# Patient Record
Sex: FEMALE | Race: WHITE | Employment: FULL TIME | URBAN - METROPOLITAN AREA
[De-identification: names, ages, dates, MRNs, and addresses within clinical notes are randomized per-mention and may not be internally consistent; named-entity substitution may affect disease eponyms.]

---

## 2017-04-04 ENCOUNTER — HOSPITAL ENCOUNTER (OUTPATIENT)
Age: 27
Discharge: HOME | End: 2017-04-04
Payer: MEDICAID

## 2017-04-04 VITALS
BODY MASS INDEX: 24.8 KG/M2 | HEIGHT: 63 IN | SYSTOLIC BLOOD PRESSURE: 134 MMHG | DIASTOLIC BLOOD PRESSURE: 83 MMHG | TEMPERATURE: 98 F | OXYGEN SATURATION: 100 % | RESPIRATION RATE: 16 BRPM | HEART RATE: 94 BPM | WEIGHT: 140 LBS

## 2017-04-04 DIAGNOSIS — J02.0 STREPTOCOCCAL SORE THROAT: Primary | ICD-10-CM

## 2017-04-04 PROCEDURE — 87430 STREP A AG IA: CPT

## 2017-04-04 PROCEDURE — 99204 OFFICE O/P NEW MOD 45 MIN: CPT

## 2017-04-04 PROCEDURE — 99213 OFFICE O/P EST LOW 20 MIN: CPT

## 2017-04-04 RX ORDER — AMOXICILLIN 875 MG/1
875 TABLET, COATED ORAL 2 TIMES DAILY
Qty: 20 TABLET | Refills: 0 | Status: SHIPPED | OUTPATIENT
Start: 2017-04-04 | End: 2017-04-14

## 2017-04-04 NOTE — ED INITIAL ASSESSMENT (HPI)
Sick since last night with sore throat, worse today. No congestion/cough. Did not take temp but c/o chills. No N/V/D. Returned last night via airplane from [facility].

## 2017-04-05 NOTE — ED PROVIDER NOTES
Patient presents with:  Sore Throat      HPI:     Nathalia Raines is a 32year old female who presents for evaluation of a chief complaint of a sore throat, body aches, and chills that started today. She recently was on an airplane.   She denies any URI symptom wheezes    MDM/Assessment/Plan:   Orders for this encounter:      Orders Placed This Encounter  POCT Rapid Strep Once  amoxicillin 875 MG Oral Tab   Sig: Take 1 tablet (875 mg total) by mouth 2 (two) times daily.    Dispense:  20 tablet   Refill:  0    Labs

## 2017-05-27 ENCOUNTER — HOSPITAL ENCOUNTER (EMERGENCY)
Facility: HOSPITAL | Age: 27
Discharge: HOME | End: 2017-05-27
Payer: MEDICAID

## 2017-05-27 ENCOUNTER — HOSPITAL (OUTPATIENT)
Dept: OTHER | Age: 27
End: 2017-05-27
Attending: EMERGENCY MEDICINE

## 2017-05-27 VITALS
SYSTOLIC BLOOD PRESSURE: 128 MMHG | TEMPERATURE: 98 F | WEIGHT: 140 LBS | BODY MASS INDEX: 24.8 KG/M2 | DIASTOLIC BLOOD PRESSURE: 77 MMHG | RESPIRATION RATE: 18 BRPM | HEART RATE: 84 BPM | HEIGHT: 63 IN | OXYGEN SATURATION: 100 %

## 2017-05-27 LAB
ANALYZER ANC (IANC): ABNORMAL
ANION GAP SERPL CALC-SCNC: 12 MMOL/L (ref 10–20)
BASOPHILS # BLD: 0 THOUSAND/MCL (ref 0–0.3)
BASOPHILS NFR BLD: 0 %
BUN SERPL-MCNC: 15 MG/DL (ref 6–20)
BUN/CREAT SERPL: 25 (ref 7–25)
CALCIUM SERPL-MCNC: 9.2 MG/DL (ref 8.4–10.2)
CHLORIDE: 103 MMOL/L (ref 98–107)
CO2 SERPL-SCNC: 29 MMOL/L (ref 21–32)
CREAT SERPL-MCNC: 0.6 MG/DL (ref 0.51–0.95)
DIFFERENTIAL METHOD BLD: ABNORMAL
EOSINOPHIL # BLD: 0 THOUSAND/MCL (ref 0.1–0.5)
EOSINOPHIL NFR BLD: 0 %
ERYTHROCYTE [DISTWIDTH] IN BLOOD: 13.6 % (ref 11–15)
GLUCOSE SERPL-MCNC: 100 MG/DL (ref 65–99)
HCG SERPL QL: NEGATIVE
HEMATOCRIT: 43.5 % (ref 36–46.5)
HGB BLD-MCNC: 15.1 GM/DL (ref 12–15.5)
LYMPHOCYTES # BLD: 1.3 THOUSAND/MCL (ref 1–4.8)
LYMPHOCYTES NFR BLD: 11 %
MCH RBC QN AUTO: 29.2 PG (ref 26–34)
MCHC RBC AUTO-ENTMCNC: 34.7 GM/DL (ref 32–36.5)
MCV RBC AUTO: 84.1 FL (ref 78–100)
MONOCYTES # BLD: 0.7 THOUSAND/MCL (ref 0.3–0.9)
MONOCYTES NFR BLD: 6 %
NEUTROPHILS # BLD: 9.6 THOUSAND/MCL (ref 1.8–7.7)
NEUTROPHILS NFR BLD: 83 %
NEUTS SEG NFR BLD: ABNORMAL %
PERCENT NRBC: ABNORMAL
PLATELET # BLD: 335 THOUSAND/MCL (ref 140–450)
POTASSIUM SERPL-SCNC: 3.8 MMOL/L (ref 3.4–5.1)
RBC # BLD: 5.17 MILLION/MCL (ref 4–5.2)
SODIUM SERPL-SCNC: 140 MMOL/L (ref 135–145)
WBC # BLD: 11.7 THOUSAND/MCL (ref 4.2–11)

## 2017-05-28 NOTE — ED INITIAL ASSESSMENT (HPI)
Patient's first day of fasting and patient started having a headache around 2pm. Vomiting started around 2020 after drinking and eating for first time today.

## 2017-11-08 ENCOUNTER — APPOINTMENT (OUTPATIENT)
Dept: GENERAL RADIOLOGY | Age: 27
End: 2017-11-08
Attending: PHYSICIAN ASSISTANT
Payer: COMMERCIAL

## 2017-11-08 ENCOUNTER — HOSPITAL ENCOUNTER (OUTPATIENT)
Age: 27
Discharge: HOME | End: 2017-11-08
Payer: COMMERCIAL

## 2017-11-08 VITALS
HEIGHT: 62 IN | DIASTOLIC BLOOD PRESSURE: 87 MMHG | WEIGHT: 156 LBS | OXYGEN SATURATION: 100 % | BODY MASS INDEX: 28.71 KG/M2 | RESPIRATION RATE: 18 BRPM | TEMPERATURE: 98 F | SYSTOLIC BLOOD PRESSURE: 139 MMHG | HEART RATE: 86 BPM

## 2017-11-08 DIAGNOSIS — S46.912A SHOULDER STRAIN, LEFT, INITIAL ENCOUNTER: Primary | ICD-10-CM

## 2017-11-08 PROCEDURE — 99213 OFFICE O/P EST LOW 20 MIN: CPT

## 2017-11-08 PROCEDURE — 73030 X-RAY EXAM OF SHOULDER: CPT | Performed by: PHYSICIAN ASSISTANT

## 2017-11-08 NOTE — ED INITIAL ASSESSMENT (HPI)
PATIENT ARRIVED AMBULATORY TO ROOM C/O LEFT SHOULDER PAIN. PT STS \"I WAS BOXING 2 DAYS AGO AND I THINK I STRAINED MY SHOULDER\" PT DENIES NUMBNESS/TINGLING TO HAND/ARM. PAIN WORSENS WITH MOVEMENT. PT ALERT AND ORIENTED X3. EASY NON LABORED RESPIRATIONS.  Ashley Martinez

## 2017-11-08 NOTE — ED PROVIDER NOTES
Patient Seen in: [facility]    History   Patient presents with:  Shoulder Pain    Stated Complaint: lt. shoulder pain    HPI    Patient is a 35-year-old female who presents for evaluation of left shoulder pain ×2 days.   P Pulmonary/Chest: Effort normal and breath sounds normal.   Musculoskeletal: She exhibits tenderness. She exhibits no edema or deformity. Left shoulder: She exhibits tenderness and pain.  She exhibits normal range of motion, no bony tenderness and no

## 2018-01-29 ENCOUNTER — HOSPITAL ENCOUNTER (OUTPATIENT)
Age: 28
Discharge: HOME | End: 2018-01-29
Attending: EMERGENCY MEDICINE
Payer: COMMERCIAL

## 2018-01-29 VITALS
OXYGEN SATURATION: 98 % | WEIGHT: 144 LBS | SYSTOLIC BLOOD PRESSURE: 133 MMHG | BODY MASS INDEX: 25.52 KG/M2 | TEMPERATURE: 98 F | RESPIRATION RATE: 20 BRPM | HEART RATE: 100 BPM | HEIGHT: 63 IN | DIASTOLIC BLOOD PRESSURE: 63 MMHG

## 2018-01-29 DIAGNOSIS — J03.90 ACUTE TONSILLITIS, UNSPECIFIED ETIOLOGY: Primary | ICD-10-CM

## 2018-01-29 LAB — S PYO AG THROAT QL: NEGATIVE

## 2018-01-29 PROCEDURE — 87430 STREP A AG IA: CPT

## 2018-01-29 PROCEDURE — 99214 OFFICE O/P EST MOD 30 MIN: CPT

## 2018-01-29 PROCEDURE — 99213 OFFICE O/P EST LOW 20 MIN: CPT

## 2018-01-29 RX ORDER — AMOXICILLIN 875 MG/1
875 TABLET, COATED ORAL 2 TIMES DAILY
Qty: 20 TABLET | Refills: 0 | Status: SHIPPED | OUTPATIENT
Start: 2018-01-29 | End: 2018-02-08

## 2018-01-29 NOTE — ED PROVIDER NOTES
Patient Seen in: [facility]    History   Patient presents with:  Sore Throat    Stated Complaint: sore throat/cold    HPI    Patient complains of sore throat which he has had for the last several days.   The patient states deviated  Neck there is positive bilateral submandibular adenopathy palpable.   Lungs are clear to auscultation  Cardiac there are normal first and second heart sounds  Abdomen is soft and nondistended  Back no CVA or tenderness  Extremities no edema  Neuro

## 2018-01-29 NOTE — ED INITIAL ASSESSMENT (HPI)
PATIENT ARRIVED AMBULATORY TO ROOM WITH MULTIPLE COMPLAINTS. SYMPTOMS STARTED SEVERAL WEEKS AGO.  PATIENT STATES \"I WAS HAVING A LOT OF ABDOMINAL DISCOMFORT THAT KIND OF ENDED AND THE LAST WEEK SINDY HAD A SORE THROAT AND NASAL CONGESTION. +SINUS PRESSURE\"

## 2018-10-25 ENCOUNTER — OFFICE VISIT (OUTPATIENT)
Dept: FAMILY MEDICINE CLINIC | Facility: CLINIC | Age: 28
End: 2018-10-25

## 2018-10-25 VITALS — TEMPERATURE: 99 F | RESPIRATION RATE: 14 BRPM

## 2018-10-25 DIAGNOSIS — J02.9 ACUTE PHARYNGITIS, UNSPECIFIED ETIOLOGY: ICD-10-CM

## 2018-10-25 DIAGNOSIS — J03.90 EXUDATIVE TONSILLITIS: Primary | ICD-10-CM

## 2018-10-25 PROCEDURE — 87880 STREP A ASSAY W/OPTIC: CPT | Performed by: NURSE PRACTITIONER

## 2018-10-25 PROCEDURE — 87081 CULTURE SCREEN ONLY: CPT | Performed by: NURSE PRACTITIONER

## 2018-10-25 PROCEDURE — 99202 OFFICE O/P NEW SF 15 MIN: CPT | Performed by: NURSE PRACTITIONER

## 2018-10-25 RX ORDER — AMOXICILLIN 875 MG/1
875 TABLET, COATED ORAL 2 TIMES DAILY
Qty: 20 TABLET | Refills: 0 | Status: SHIPPED | OUTPATIENT
Start: 2018-10-25 | End: 2018-11-04

## 2018-10-25 NOTE — PROGRESS NOTES
CHIEF COMPLAINT:   Patient presents with:  Sore Throat      HPI:   Karl Cheng is a 29year old female presents to clinic with symptoms of sore throat. Yesterday left tonsil swollen with white spot, fatigued, painful to swallow with radiation to ear.  Some Results (from the past 24 hour(s))   STREP A ASSAY W/OPTIC    Collection Time: 10/25/18  3:12 PM   Result Value Ref Range    STREP GRP A CUL-SCR negative Negative    Control Line Present with a clear background (yes/no) yes Yes/No    Kit Lot # [id number]

## 2019-02-07 ENCOUNTER — OFFICE VISIT (OUTPATIENT)
Dept: FAMILY MEDICINE CLINIC | Facility: CLINIC | Age: 29
End: 2019-02-07

## 2019-02-07 VITALS
SYSTOLIC BLOOD PRESSURE: 124 MMHG | DIASTOLIC BLOOD PRESSURE: 82 MMHG | TEMPERATURE: 99 F | WEIGHT: 152 LBS | HEART RATE: 74 BPM | BODY MASS INDEX: 26.93 KG/M2 | HEIGHT: 63 IN | OXYGEN SATURATION: 100 %

## 2019-02-07 DIAGNOSIS — G44.84 EXERTIONAL HEADACHE: Primary | ICD-10-CM

## 2019-02-07 DIAGNOSIS — R06.02 EXERCISE-INDUCED SHORTNESS OF BREATH: ICD-10-CM

## 2019-02-07 PROCEDURE — 99213 OFFICE O/P EST LOW 20 MIN: CPT | Performed by: PHYSICIAN ASSISTANT

## 2019-02-08 NOTE — PATIENT INSTRUCTIONS
Shortness of Breath (Dyspnea)  Shortness of breath is the feeling that you can't catch your breath or get enough air. It is also known as dyspnea. Dyspnea can be caused by many different conditions.  They include:  · Acute asthma attack  · Worsening of c If an X-ray was taken, a specialist will review it. You will be notified of any new findings that may affect your care. Call 911  Shortness of breath may be a sign of a serious medical problem. For example, it may be a problem with your heart or lungs.  Ca The key to controlling panic is to break the cycle before it starts. When you are becoming short of breath do the following:  · Sit, relax your arms and shoulders. · Lean forward, resting your upper body on your forearms.   · Breathe in slowly through your

## 2019-02-08 NOTE — PROGRESS NOTES
CHIEF COMPLAINT:     Patient presents with:  Shortness Of Breath: 2 days, labored breathing, \"cant catch breath\"  working on conditioning/training.  feels SOB after 4 mins, + headache after running      HPI:   Galo Mas is a 29year old female who pres Tobacco comment: hookah    Alcohol use: Not on file    Drug use: No       REVIEW OF SYSTEMS:   GENERAL: Denies fever, chills,weight change, decreased appetite  EYES: Denies blurred vision or double vision  HENT: Denies congestion, rhinorrhea, sore thro PLAN:  Lengthy discussion on potential etiology:  Exercise induced bronchospasm vs cardiac abnormality vs anxiety/panic/stress.  Discussed how can be multifactorial.  Advised patient avoid cardiovascular exercise until evaluated by PCP/cardiologist.  Nivia Campos · If you smoke, you should stop. Join a quit-smoking program or ask your healthcare provider for help. · Eat a healthy diet and get plenty of sleep. · Get regular exercise.  Talk with your healthcare provider before starting to exercise, especially if you When you have lung problems, it may be hard for you to breathe. Stress can make it worse. Learn to relax and control stress to prevent shortness of breath and avoid panic.   When anxiety takes over  When you feel short of breath, your neck, shoulder, and ch © 4126-9928 [employer] [address]. [address] [facility], [facility] [place]. All rights reserved. This information is not intended as a substitute for professional medical care. Always follow your healthcare professional's instructions.

## 2019-05-15 ENCOUNTER — HOSPITAL ENCOUNTER (EMERGENCY)
Facility: HOSPITAL | Age: 29
Discharge: HOME | End: 2019-05-16
Attending: EMERGENCY MEDICINE

## 2019-05-15 VITALS
SYSTOLIC BLOOD PRESSURE: 96 MMHG | OXYGEN SATURATION: 97 % | RESPIRATION RATE: 15 BRPM | DIASTOLIC BLOOD PRESSURE: 67 MMHG | TEMPERATURE: 98 F | HEART RATE: 64 BPM

## 2019-05-15 DIAGNOSIS — G43.009 MIGRAINE WITHOUT AURA AND WITHOUT STATUS MIGRAINOSUS, NOT INTRACTABLE: Primary | ICD-10-CM

## 2019-05-15 PROCEDURE — 80048 BASIC METABOLIC PNL TOTAL CA: CPT | Performed by: EMERGENCY MEDICINE

## 2019-05-15 PROCEDURE — 85025 COMPLETE CBC W/AUTO DIFF WBC: CPT | Performed by: EMERGENCY MEDICINE

## 2019-05-15 PROCEDURE — 96375 TX/PRO/DX INJ NEW DRUG ADDON: CPT

## 2019-05-15 PROCEDURE — 99284 EMERGENCY DEPT VISIT MOD MDM: CPT

## 2019-05-15 PROCEDURE — 96374 THER/PROPH/DIAG INJ IV PUSH: CPT

## 2019-05-15 PROCEDURE — 96361 HYDRATE IV INFUSION ADD-ON: CPT

## 2019-05-15 PROCEDURE — 81025 URINE PREGNANCY TEST: CPT

## 2019-05-15 RX ORDER — METOCLOPRAMIDE HYDROCHLORIDE 5 MG/ML
10 INJECTION INTRAMUSCULAR; INTRAVENOUS ONCE
Status: COMPLETED | OUTPATIENT
Start: 2019-05-15 | End: 2019-05-15

## 2019-05-15 RX ORDER — DEXAMETHASONE SODIUM PHOSPHATE 4 MG/ML
10 VIAL (ML) INJECTION ONCE
Status: COMPLETED | OUTPATIENT
Start: 2019-05-15 | End: 2019-05-15

## 2019-05-15 RX ORDER — DIPHENHYDRAMINE HYDROCHLORIDE 50 MG/ML
25 INJECTION INTRAMUSCULAR; INTRAVENOUS ONCE
Status: COMPLETED | OUTPATIENT
Start: 2019-05-15 | End: 2019-05-15

## 2019-05-16 NOTE — ED PROVIDER NOTES
Patient Seen in: [facility] AND [facility] Emergency Department    History   Patient presents with:  Headache (neurologic)    Stated Complaint: Migraine x1 day with Hx of Same    HPI    Patient is a 20-year-old female who presents with right-sided throbbing head soft, nontender, no distension  Extremities: FROM of all extremities, no cyanosis/clubbing/edema  Neuro: CN intact, normal speech, normal gait, 5/5 motor strength in all extremities, no focal deficits  SKIN: warm, dry, no rashes        ED Course     Labs R

## 2020-01-30 ENCOUNTER — OFFICE VISIT (OUTPATIENT)
Dept: INTERNAL MEDICINE CLINIC | Facility: CLINIC | Age: 30
End: 2020-01-30

## 2020-01-30 VITALS
BODY MASS INDEX: 29.3 KG/M2 | RESPIRATION RATE: 18 BRPM | TEMPERATURE: 97 F | OXYGEN SATURATION: 97 % | HEIGHT: 63 IN | SYSTOLIC BLOOD PRESSURE: 127 MMHG | WEIGHT: 165.38 LBS | DIASTOLIC BLOOD PRESSURE: 89 MMHG | HEART RATE: 81 BPM

## 2020-01-30 DIAGNOSIS — Z00.00 PHYSICAL EXAM: Primary | ICD-10-CM

## 2020-01-30 DIAGNOSIS — G43.809 OTHER MIGRAINE WITHOUT STATUS MIGRAINOSUS, NOT INTRACTABLE: ICD-10-CM

## 2020-01-30 PROBLEM — G43.909 MIGRAINE: Status: ACTIVE | Noted: 2020-01-30

## 2020-01-30 PROCEDURE — 99204 OFFICE O/P NEW MOD 45 MIN: CPT | Performed by: INTERNAL MEDICINE

## 2020-01-30 RX ORDER — TOPIRAMATE 25 MG/1
25 CAPSULE, COATED PELLETS ORAL 2 TIMES DAILY
Qty: 69 CAPSULE | Refills: 0 | Status: SHIPPED | OUTPATIENT
Start: 2020-01-30 | End: 2020-02-28

## 2020-01-30 NOTE — PROGRESS NOTES
HPI:    Patient ID: Dena Lamb is a 34year old female. Patient presents with:  Headache: Pt is coming in as a new pt and for migraine with exercise  Patient presents as a new patient today for evaluation on the migraine headache that she has for long. throat. Eyes: Positive for photophobia. Negative for pain and redness. Respiratory: Negative for cough, shortness of breath and wheezing. Cardiovascular: Negative for chest pain, palpitations and leg swelling.    Gastrointestinal: Negative for abdom General: No edema. Lymphadenopathy:     She has no cervical adenopathy. Neurological: She is alert and oriented to person, place, and time. Skin: Skin is warm and dry. Psychiatric: She has a normal mood and affect.  Her behavior is normal. Panel [E]      TSH W Reflex To Free T4 [E]      Urinalysis, Routine [E]      Meds This Visit:  Requested Prescriptions     Signed Prescriptions Disp Refills   • Topiramate 25 MG Oral Capsule Sprinkle 69 capsule 0     Sig: Take 1 capsule (25 mg total) by mo

## 2020-02-01 ENCOUNTER — LAB ENCOUNTER (OUTPATIENT)
Dept: LAB | Age: 30
End: 2020-02-01
Attending: INTERNAL MEDICINE
Payer: COMMERCIAL

## 2020-02-01 DIAGNOSIS — Z00.00 PHYSICAL EXAM: ICD-10-CM

## 2020-02-01 LAB
ALBUMIN SERPL-MCNC: 3.7 G/DL (ref 3.4–5)
ALBUMIN/GLOB SERPL: 1 {RATIO} (ref 1–2)
ALP LIVER SERPL-CCNC: 66 U/L (ref 37–98)
ALT SERPL-CCNC: 26 U/L (ref 13–56)
ANION GAP SERPL CALC-SCNC: 5 MMOL/L (ref 0–18)
AST SERPL-CCNC: 15 U/L (ref 15–37)
BACTERIA UR QL AUTO: NEGATIVE /HPF
BASOPHILS # BLD AUTO: 0.03 X10(3) UL (ref 0–0.2)
BASOPHILS NFR BLD AUTO: 0.4 %
BILIRUB SERPL-MCNC: 0.8 MG/DL (ref 0.1–2)
BILIRUB UR QL: NEGATIVE
BUN BLD-MCNC: 11 MG/DL (ref 7–18)
BUN/CREAT SERPL: 14.5 (ref 10–20)
CALCIUM BLD-MCNC: 8.8 MG/DL (ref 8.5–10.1)
CHLORIDE SERPL-SCNC: 107 MMOL/L (ref 98–112)
CHOLEST SMN-MCNC: 188 MG/DL (ref ?–200)
CLARITY UR: CLEAR
CO2 SERPL-SCNC: 26 MMOL/L (ref 21–32)
COLOR UR: YELLOW
CREAT BLD-MCNC: 0.76 MG/DL (ref 0.55–1.02)
DEPRECATED RDW RBC AUTO: 41.5 FL (ref 35.1–46.3)
EOSINOPHIL # BLD AUTO: 0.19 X10(3) UL (ref 0–0.7)
EOSINOPHIL NFR BLD AUTO: 2.7 %
ERYTHROCYTE [DISTWIDTH] IN BLOOD BY AUTOMATED COUNT: 13.4 % (ref 11–15)
GLOBULIN PLAS-MCNC: 3.7 G/DL (ref 2.8–4.4)
GLUCOSE BLD-MCNC: 89 MG/DL (ref 70–99)
GLUCOSE UR-MCNC: NEGATIVE MG/DL
HCT VFR BLD AUTO: 44.7 % (ref 35–48)
HDLC SERPL-MCNC: 57 MG/DL (ref 40–59)
HGB BLD-MCNC: 14.5 G/DL (ref 12–16)
HGB UR QL STRIP.AUTO: NEGATIVE
IMM GRANULOCYTES # BLD AUTO: 0.02 X10(3) UL (ref 0–1)
IMM GRANULOCYTES NFR BLD: 0.3 %
KETONES UR-MCNC: NEGATIVE MG/DL
LDLC SERPL CALC-MCNC: 120 MG/DL (ref ?–100)
LYMPHOCYTES # BLD AUTO: 2.79 X10(3) UL (ref 1–4)
LYMPHOCYTES NFR BLD AUTO: 40.1 %
M PROTEIN MFR SERPL ELPH: 7.4 G/DL (ref 6.4–8.2)
MCH RBC QN AUTO: 27.2 PG (ref 26–34)
MCHC RBC AUTO-ENTMCNC: 32.4 G/DL (ref 31–37)
MCV RBC AUTO: 83.9 FL (ref 80–100)
MONOCYTES # BLD AUTO: 0.6 X10(3) UL (ref 0.1–1)
MONOCYTES NFR BLD AUTO: 8.6 %
NEUTROPHILS # BLD AUTO: 3.32 X10 (3) UL (ref 1.5–7.7)
NEUTROPHILS # BLD AUTO: 3.32 X10(3) UL (ref 1.5–7.7)
NEUTROPHILS NFR BLD AUTO: 47.9 %
NITRITE UR QL STRIP.AUTO: NEGATIVE
NONHDLC SERPL-MCNC: 131 MG/DL (ref ?–130)
OSMOLALITY SERPL CALC.SUM OF ELEC: 285 MOSM/KG (ref 275–295)
PATIENT FASTING Y/N/NP: YES
PATIENT FASTING Y/N/NP: YES
PH UR: 8 [PH] (ref 5–8)
PLATELET # BLD AUTO: 366 10(3)UL (ref 150–450)
POTASSIUM SERPL-SCNC: 4.2 MMOL/L (ref 3.5–5.1)
PROT UR-MCNC: 30 MG/DL
RBC # BLD AUTO: 5.33 X10(6)UL (ref 3.8–5.3)
RBC #/AREA URNS AUTO: <1 /HPF
SODIUM SERPL-SCNC: 138 MMOL/L (ref 136–145)
SP GR UR STRIP: 1.01 (ref 1–1.03)
TRIGL SERPL-MCNC: 54 MG/DL (ref 30–149)
TSI SER-ACNC: 2.17 MIU/ML (ref 0.36–3.74)
UROBILINOGEN UR STRIP-ACNC: <2
VLDLC SERPL CALC-MCNC: 11 MG/DL (ref 0–30)
WBC # BLD AUTO: 7 X10(3) UL (ref 4–11)
WBC #/AREA URNS AUTO: 4 /HPF

## 2020-02-01 PROCEDURE — 80061 LIPID PANEL: CPT

## 2020-02-01 PROCEDURE — 80053 COMPREHEN METABOLIC PANEL: CPT

## 2020-02-01 PROCEDURE — 85025 COMPLETE CBC W/AUTO DIFF WBC: CPT

## 2020-02-01 PROCEDURE — 84443 ASSAY THYROID STIM HORMONE: CPT

## 2020-02-01 PROCEDURE — 36415 COLL VENOUS BLD VENIPUNCTURE: CPT

## 2020-02-01 PROCEDURE — 81001 URINALYSIS AUTO W/SCOPE: CPT

## 2020-02-03 NOTE — PROGRESS NOTES
Please call patient with blood test results. Kidney and liver function are normal, no anemia. Cholesterol is normal -minimally elevated LDL cholesterol keep with low saturated fat diet less red meat ,  fried food butter cheese. ..  Try to lose some weig

## 2020-02-04 ENCOUNTER — OFFICE VISIT (OUTPATIENT)
Dept: INTERNAL MEDICINE CLINIC | Facility: CLINIC | Age: 30
End: 2020-02-04
Payer: COMMERCIAL

## 2020-02-04 ENCOUNTER — NURSE TRIAGE (OUTPATIENT)
Dept: OTHER | Age: 30
End: 2020-02-04

## 2020-02-04 VITALS
HEART RATE: 116 BPM | SYSTOLIC BLOOD PRESSURE: 125 MMHG | TEMPERATURE: 100 F | RESPIRATION RATE: 26 BRPM | BODY MASS INDEX: 29 KG/M2 | WEIGHT: 164 LBS | DIASTOLIC BLOOD PRESSURE: 81 MMHG

## 2020-02-04 DIAGNOSIS — R50.9 FEVER, UNSPECIFIED FEVER CAUSE: Primary | ICD-10-CM

## 2020-02-04 LAB
APPEARANCE: CLEAR
BILIRUBIN: NEGATIVE
FLUAV + FLUBV RNA SPEC NAA+PROBE: NEGATIVE
FLUAV + FLUBV RNA SPEC NAA+PROBE: NEGATIVE
FLUAV + FLUBV RNA SPEC NAA+PROBE: POSITIVE
GLUCOSE (URINE DIPSTICK): NEGATIVE MG/DL
KETONES (URINE DIPSTICK): NEGATIVE MG/DL
MULTISTIX LOT#: NORMAL NUMERIC
NITRITE, URINE: NEGATIVE
PH, URINE: 4.5 (ref 4.5–8)
PROTEIN (URINE DIPSTICK): NEGATIVE MG/DL
SPECIFIC GRAVITY: 1 (ref 1–1.03)
URINE-COLOR: YELLOW
UROBILINOGEN,SEMI-QN: 0 MG/DL (ref 0–1.9)

## 2020-02-04 PROCEDURE — 99214 OFFICE O/P EST MOD 30 MIN: CPT | Performed by: INTERNAL MEDICINE

## 2020-02-04 PROCEDURE — 81002 URINALYSIS NONAUTO W/O SCOPE: CPT | Performed by: INTERNAL MEDICINE

## 2020-02-04 RX ORDER — CEPHALEXIN 500 MG/1
500 CAPSULE ORAL 3 TIMES DAILY
Qty: 21 CAPSULE | Refills: 0 | Status: SHIPPED | OUTPATIENT
Start: 2020-02-04 | End: 2020-02-28

## 2020-02-04 RX ORDER — CEFDINIR 300 MG/1
300 CAPSULE ORAL 2 TIMES DAILY
Qty: 14 CAPSULE | Refills: 0 | Status: SHIPPED | OUTPATIENT
Start: 2020-02-04 | End: 2020-02-28

## 2020-02-04 RX ORDER — OSELTAMIVIR PHOSPHATE 75 MG/1
75 CAPSULE ORAL 2 TIMES DAILY
Qty: 10 CAPSULE | Refills: 0 | Status: SHIPPED | OUTPATIENT
Start: 2020-02-04 | End: 2020-02-28

## 2020-02-04 NOTE — TELEPHONE ENCOUNTER
Action Requested: Summary for Provider     []  Critical Lab, Recommendations Needed  [] Need Additional Advice  []   FYI    []   Need Orders  [] Need Medications Sent to Pharmacy  []  Other     SUMMARY:   Patient calling with fever for 2 days today ap

## 2020-02-04 NOTE — PROGRESS NOTES
HPI:    Patient ID: Maria R Winston is a 34year old female. Presents for evaluation of the fever .   HPI  Since last night sudden onset of high fever body aches, develop chest congestion and mucousy cough, generalized fatigue, denies dysuria, she does have f range of motion. Neck supple. No JVD present. Cardiovascular: Normal rate, regular rhythm and normal heart sounds. No murmur heard. Edema not present. Abdominal: Soft. She exhibits no mass. There is no hepatosplenomegaly. There is tenderness.  There

## 2020-02-05 ENCOUNTER — NURSE TRIAGE (OUTPATIENT)
Dept: OTHER | Age: 30
End: 2020-02-05

## 2020-02-05 NOTE — TELEPHONE ENCOUNTER
Patient returning Dr Mirian Ceballos call. Patient advised of note below. Patient indicated that she picked up the tamiflu late last night and took 2 tablets at once and started vomiting.  Patient does not want to take the tamiflu because of the vomiting and the s

## 2020-02-05 NOTE — TELEPHONE ENCOUNTER
Spoke to patient this morning, she states that she does not want to take Tamiflu because she felt awful vomited after taking 2 tablets together.   Still running temperature, advised her to continue taking Tylenol alternating with ibuprofen for couple more d

## 2020-02-13 ENCOUNTER — TELEPHONE (OUTPATIENT)
Dept: INTERNAL MEDICINE CLINIC | Facility: CLINIC | Age: 30
End: 2020-02-13

## 2020-02-13 NOTE — TELEPHONE ENCOUNTER
Patient should stop  medication-especially if she is taking other medications along with this medicine.       Can hold it for a week or 2 and then possibly restart if she feels the same way she would stop medicine not use it anymore    And follow-up with me

## 2020-02-13 NOTE — TELEPHONE ENCOUNTER
Patient state she started taking the Topamax on Tuesday night for her migraine headaches. Patient states last night she had really bad racing thoughts that were non stop and she had no control over them. She was up most of the night.  Patient states she did

## 2020-02-28 ENCOUNTER — OFFICE VISIT (OUTPATIENT)
Dept: NEUROLOGY | Facility: CLINIC | Age: 30
End: 2020-02-28

## 2020-02-28 VITALS
BODY MASS INDEX: 28.35 KG/M2 | HEART RATE: 84 BPM | DIASTOLIC BLOOD PRESSURE: 80 MMHG | WEIGHT: 160 LBS | HEIGHT: 63 IN | SYSTOLIC BLOOD PRESSURE: 120 MMHG

## 2020-02-28 DIAGNOSIS — G43.009 MIGRAINE WITHOUT AURA AND WITHOUT STATUS MIGRAINOSUS, NOT INTRACTABLE: ICD-10-CM

## 2020-02-28 DIAGNOSIS — R51.9 NEW ONSET HEADACHE: Primary | ICD-10-CM

## 2020-02-28 PROCEDURE — 99204 OFFICE O/P NEW MOD 45 MIN: CPT | Performed by: OTHER

## 2020-02-28 RX ORDER — SUMATRIPTAN 100 MG/1
TABLET, FILM COATED ORAL
Qty: 9 TABLET | Refills: 5 | Status: SHIPPED | OUTPATIENT
Start: 2020-02-28

## 2020-02-28 NOTE — PATIENT INSTRUCTIONS
If your migraine attacks are becoming increasingly more frequent, if you are suffering attacks of disabling migraine despite optimal use of acute therapies, if you are experiencing some degree of headache more days than not, or if your migraine has evolved it is necessary to begin treatment at a low dose and subsequently increase to a target dose over a period of weeks. This can be an especially tough time for a patient who is experiencing headache on a daily or near-daily basis.  There is no current therapy than 10 days per month. Of all the medications available for acute migraine treatment, the nonsteroidal anti-inflammatory drugs appear to have the lowest potential for promoting medication overuse headache.     • Chronic, indefinite use of a medication for

## 2020-02-28 NOTE — PROGRESS NOTES
Neurology Initial Visit     Referred By: Dr. Mcclelland ref. provider found    Chief Complaint: Patient presents with:  Headache: Patient presents today c/o migraines, started about 3 years ago.  She states that she gets them about 3 times per month, usually trig apparent distress, well nourished, well groomed.   Head- Normocephalic, atraumatic  Eyes- No redness or swelling  ENT- Hearing intake, normal glutition  Neck- No masses or adenopathy  Cv: pulses were palpable and normal, no cyanosis or edema     Neurologica ALT 26 02/01/2020    AST 15 02/01/2020    ALB 3.7 02/01/2020     02/01/2020    K 4.2 02/01/2020     02/01/2020    CO2 26.0 02/01/2020      I have reviewed labs. Assessment   1. New onset headache  Associated with exercise.   Therefore treat

## 2020-03-02 ENCOUNTER — TELEPHONE (OUTPATIENT)
Dept: NEUROLOGY | Facility: CLINIC | Age: 30
End: 2020-03-02

## 2020-03-02 NOTE — TELEPHONE ENCOUNTER
Denial for Aimovig. Patient must try and fail multiple medications prior to being considered.       Sent to scan English

## 2020-03-04 ENCOUNTER — MED REC SCAN ONLY (OUTPATIENT)
Dept: NEUROLOGY | Facility: CLINIC | Age: 30
End: 2020-03-04

## 2020-03-23 ENCOUNTER — TELEPHONE (OUTPATIENT)
Dept: INTERNAL MEDICINE CLINIC | Facility: CLINIC | Age: 30
End: 2020-03-23

## 2020-03-23 ENCOUNTER — NURSE TRIAGE (OUTPATIENT)
Dept: INTERNAL MEDICINE CLINIC | Facility: CLINIC | Age: 30
End: 2020-03-23

## 2020-03-23 PROCEDURE — 99442 PHONE E/M BY PHYS 11-20 MIN: CPT | Performed by: INTERNAL MEDICINE

## 2020-03-23 NOTE — TELEPHONE ENCOUNTER
Action Requested: Summary for Provider     []  Critical Lab, Recommendations Needed  [] Need Additional Advice  []   FYI    []   Need Orders  [] Need Medications Sent to Pharmacy  []  Other     SUMMARY:   Per patient, fever of 102 on Saturday, felt better

## 2020-03-23 NOTE — TELEPHONE ENCOUNTER
Your patient called with stating that Saturday she woke up at 4am with 102 fever. She was alternating OTC pain relievers. She states that Sunday she was better, no fever.  But today she states that she was coughing and is experiencing heavy chest/chest

## 2020-03-24 NOTE — TELEPHONE ENCOUNTER
Virtual/Telephone Check-In    Galo Mas verbally consents to a Virtual/Telephone Check-In service on 03/23/20. Patient understands and accepts financial responsibility for any deductible, co-insurance and/or co-pays associated with this service.     [place]

## 2021-01-13 ENCOUNTER — NURSE TRIAGE (OUTPATIENT)
Dept: INTERNAL MEDICINE CLINIC | Facility: CLINIC | Age: 31
End: 2021-01-13

## 2021-01-13 NOTE — TELEPHONE ENCOUNTER
Noted ,agreed with the triage nurse advice given patient to be seen in  Immediate care in the area where she is right now    Follow-up coming back to [facility]   within 1 week or sooner if any concerns or symptoms

## 2021-01-13 NOTE — TELEPHONE ENCOUNTER
Action Requested: Summary for Provider     []  Critical Lab, Recommendations Needed  [] Need Additional Advice  [x]   FYI    []   Need Orders  [] Need Medications Sent to Pharmacy  []  Other     SUMMARY: name an  verified.  Patient calls with complaints Statement Selected

## 2022-12-22 ENCOUNTER — OFFICE VISIT (OUTPATIENT)
Dept: INTERNAL MEDICINE CLINIC | Facility: CLINIC | Age: 32
End: 2022-12-22
Payer: COMMERCIAL

## 2022-12-22 VITALS
DIASTOLIC BLOOD PRESSURE: 87 MMHG | HEIGHT: 63 IN | SYSTOLIC BLOOD PRESSURE: 125 MMHG | BODY MASS INDEX: 29.23 KG/M2 | HEART RATE: 86 BPM | WEIGHT: 165 LBS

## 2022-12-22 DIAGNOSIS — R22.43 MASS OF BOTH LOWER EXTREMITIES: Primary | ICD-10-CM

## 2022-12-22 PROCEDURE — 3074F SYST BP LT 130 MM HG: CPT | Performed by: NURSE PRACTITIONER

## 2022-12-22 PROCEDURE — 3008F BODY MASS INDEX DOCD: CPT | Performed by: NURSE PRACTITIONER

## 2022-12-22 PROCEDURE — 3079F DIAST BP 80-89 MM HG: CPT | Performed by: NURSE PRACTITIONER

## 2022-12-22 PROCEDURE — 99213 OFFICE O/P EST LOW 20 MIN: CPT | Performed by: NURSE PRACTITIONER

## 2023-01-23 ENCOUNTER — OFFICE VISIT (OUTPATIENT)
Dept: SURGERY | Facility: CLINIC | Age: 33
End: 2023-01-23

## 2023-01-23 DIAGNOSIS — I83.813 VARICOSE VEINS OF BILATERAL LOWER EXTREMITIES WITH PAIN: Primary | ICD-10-CM

## 2023-01-23 DIAGNOSIS — D17.1 LIPOMA OF TORSO: ICD-10-CM

## 2023-01-23 PROCEDURE — 99203 OFFICE O/P NEW LOW 30 MIN: CPT | Performed by: SURGERY

## 2023-01-23 NOTE — PATIENT INSTRUCTIONS
Obtain your ultrasound of both legs to evaluate for varicose veins.   Follow-up with me in the clinic to discuss further treatment

## 2023-01-27 ENCOUNTER — HOSPITAL ENCOUNTER (OUTPATIENT)
Dept: ULTRASOUND IMAGING | Facility: HOSPITAL | Age: 33
Discharge: HOME | End: 2023-01-27
Attending: SURGERY
Payer: COMMERCIAL

## 2023-01-27 ENCOUNTER — TELEPHONE (OUTPATIENT)
Dept: SURGERY | Facility: CLINIC | Age: 33
End: 2023-01-27

## 2023-01-27 DIAGNOSIS — I83.813 VARICOSE VEINS OF BILATERAL LOWER EXTREMITIES WITH PAIN: ICD-10-CM

## 2023-01-27 PROCEDURE — 93970 EXTREMITY STUDY: CPT | Performed by: SURGERY

## 2023-01-27 NOTE — TELEPHONE ENCOUNTER
Good Morning Dr. Pipe Oconnell,     Just following up. Patient had the US that you ordered. Please review and call patient. Advise if we need to do surgery. Patient @ [phone].       RAYMOND BETANCUR

## 2023-02-06 ENCOUNTER — OFFICE VISIT (OUTPATIENT)
Dept: SURGERY | Facility: CLINIC | Age: 33
End: 2023-02-06

## 2023-02-06 DIAGNOSIS — D17.1 LIPOMA OF TORSO: Primary | ICD-10-CM

## 2023-02-06 DIAGNOSIS — D17.20 LIPOMA OF LOWER EXTREMITY, UNSPECIFIED LATERALITY: ICD-10-CM

## 2023-02-06 PROCEDURE — 99213 OFFICE O/P EST LOW 20 MIN: CPT | Performed by: SURGERY

## 2023-02-06 NOTE — PROGRESS NOTES
Surgery note    Patient presents now for further questions regarding her multiple lipomas of the lower extremities, left buttock, lower abdomen. She did have a vascular ultrasound which reveals reflux however vascular surgeon states she does not need surgery for this. Patient does wish to proceed with having her lipomas excised. We discussed the removal process. I gave her a Skin Skribe to aidan all the lipomas that she wants excised and we will plan for same-day surgery. All questions answered to her satisfaction.

## 2023-03-26 ENCOUNTER — HOSPITAL ENCOUNTER (OUTPATIENT)
Age: 33
Discharge: HOME | End: 2023-03-26
Payer: COMMERCIAL

## 2023-03-26 VITALS
HEART RATE: 92 BPM | RESPIRATION RATE: 20 BRPM | SYSTOLIC BLOOD PRESSURE: 145 MMHG | TEMPERATURE: 98 F | OXYGEN SATURATION: 98 % | DIASTOLIC BLOOD PRESSURE: 89 MMHG

## 2023-03-26 DIAGNOSIS — Z20.822 LAB TEST NEGATIVE FOR COVID-19 VIRUS: ICD-10-CM

## 2023-03-26 DIAGNOSIS — B34.9 VIRAL ILLNESS: Primary | ICD-10-CM

## 2023-03-26 LAB — SARS-COV-2 RNA RESP QL NAA+PROBE: NOT DETECTED

## 2023-03-26 PROCEDURE — 99203 OFFICE O/P NEW LOW 30 MIN: CPT

## 2023-03-26 PROCEDURE — 99212 OFFICE O/P EST SF 10 MIN: CPT

## 2023-03-26 NOTE — ED INITIAL ASSESSMENT (HPI)
Patient feeling ill since Wednesday with symptoms worsening on Friday. Was seen at an immediate care in [place]. Strep negative at that time. Was given cefdinir and dexamethasone. States she is still cough and body aches.

## 2023-03-26 NOTE — DISCHARGE INSTRUCTIONS
Recommend over-the-counter Flonase and 24-hour Zyrtec to help with postnasal drip and congestion. You may try over-the-counter throat lozenges or over-the-counter Chloraseptic spray for throat discomfort. Take ibuprofen or Tylenol for pain. Drink plenty of fluids warm tea with honey and eat as tolerated. Follow-up with your primary care provider if symptoms persist or worsen. If you develop fever severe headache neck stiffness nausea vomiting diarrhea chest pain shortness of breath and not able to speak in a full sentence weakness numbness or tingling to extremities or any new or worsening symptoms go to the nearest emergency department.

## 2023-06-05 ENCOUNTER — HOSPITAL ENCOUNTER (OUTPATIENT)
Age: 33
Discharge: HOME | End: 2023-06-05
Payer: COMMERCIAL

## 2023-06-05 VITALS
HEART RATE: 84 BPM | TEMPERATURE: 98 F | DIASTOLIC BLOOD PRESSURE: 89 MMHG | SYSTOLIC BLOOD PRESSURE: 136 MMHG | OXYGEN SATURATION: 98 % | RESPIRATION RATE: 16 BRPM

## 2023-06-05 DIAGNOSIS — H10.33 ACUTE CONJUNCTIVITIS OF BOTH EYES, UNSPECIFIED ACUTE CONJUNCTIVITIS TYPE: Primary | ICD-10-CM

## 2023-06-05 PROCEDURE — 99213 OFFICE O/P EST LOW 20 MIN: CPT

## 2023-06-05 RX ORDER — CIPROFLOXACIN HYDROCHLORIDE 3.5 MG/ML
SOLUTION/ DROPS TOPICAL
COMMUNITY
Start: 2023-06-04

## 2023-06-05 RX ORDER — ERYTHROMYCIN 5 MG/G
1 OINTMENT OPHTHALMIC EVERY 6 HOURS
Qty: 3.5 G | Refills: 1 | Status: SHIPPED | OUTPATIENT
Start: 2023-06-05 | End: 2023-06-12

## 2023-06-05 NOTE — DISCHARGE INSTRUCTIONS
Please stop using eyedrops, I prescribed eye ointment. Please use this 4 times a day for 7 days. You cannot wear contacts until you complete full course of antibiotics. As discussed, please wash all sheets, bedding, towels, pillowcases etc.  Cool compresses daily. Gentle cleansing of lids and lashes. Be sure to use a different portion of washcloth with each cleansing. Follow-up with ENT physician for your chronic congestive symptoms.   Go to ER if you have any new or worsening eye swelling, vision changes, fevers above 100.4

## 2023-06-05 NOTE — ED INITIAL ASSESSMENT (HPI)
Pt presents with bilateral eye redness, discharge and swelling x 3 days. Currently on ciprofloxacin eye drops, but states getting worse. Also c/o nasal congestion and sore throat x 1 month.  No fever

## 2023-06-07 ENCOUNTER — HOSPITAL ENCOUNTER (OUTPATIENT)
Age: 33
Discharge: HOME | End: 2023-06-07
Attending: EMERGENCY MEDICINE
Payer: COMMERCIAL

## 2023-06-07 VITALS
HEART RATE: 97 BPM | SYSTOLIC BLOOD PRESSURE: 122 MMHG | RESPIRATION RATE: 18 BRPM | DIASTOLIC BLOOD PRESSURE: 92 MMHG | OXYGEN SATURATION: 97 % | TEMPERATURE: 97 F

## 2023-06-07 DIAGNOSIS — J06.9 VIRAL URI: Primary | ICD-10-CM

## 2023-06-07 LAB — S PYO AG THROAT QL IA.RAPID: NEGATIVE

## 2023-06-07 PROCEDURE — 99212 OFFICE O/P EST SF 10 MIN: CPT

## 2023-06-07 PROCEDURE — 87651 STREP A DNA AMP PROBE: CPT | Performed by: EMERGENCY MEDICINE

## 2023-06-07 PROCEDURE — 99213 OFFICE O/P EST LOW 20 MIN: CPT

## 2023-12-12 ENCOUNTER — HOSPITAL ENCOUNTER (OUTPATIENT)
Age: 33
Discharge: HOME | End: 2023-12-12
Payer: COMMERCIAL

## 2023-12-12 VITALS
RESPIRATION RATE: 18 BRPM | SYSTOLIC BLOOD PRESSURE: 135 MMHG | TEMPERATURE: 97 F | OXYGEN SATURATION: 100 % | HEART RATE: 85 BPM | DIASTOLIC BLOOD PRESSURE: 89 MMHG

## 2023-12-12 DIAGNOSIS — J02.9 ACUTE VIRAL PHARYNGITIS: Primary | ICD-10-CM

## 2023-12-12 LAB — S PYO AG THROAT QL IA.RAPID: NEGATIVE

## 2023-12-12 PROCEDURE — 99213 OFFICE O/P EST LOW 20 MIN: CPT

## 2023-12-12 PROCEDURE — 99212 OFFICE O/P EST SF 10 MIN: CPT

## 2023-12-12 PROCEDURE — 87651 STREP A DNA AMP PROBE: CPT | Performed by: NURSE PRACTITIONER

## 2023-12-13 NOTE — DISCHARGE INSTRUCTIONS
Continue to push fluids, salt water gargles, Tylenol and or ibuprofen for any discomfort. Throat lozenges and a teaspoon of honey may also help. If any worsening of your symptoms recheck a home COVID test tomorrow or the following day. Activity as tolerated Thank you for choosing our [facility] [place] for your medical condition today. Please be sure to follow up with your primary care provider in 2 days if no improvement, or as directed. If any worsening of your symptoms or other concerns call your primary care provider, return here or go to the emergency department.

## 2023-12-13 NOTE — ED INITIAL ASSESSMENT (HPI)
Patient reports having a sore throat, fever, post nasal drip and chills that started yesterday. Patient states she took an at home covid test, which was negative. Patient states she has been around people with strep and requesting strep testing.

## 2024-03-04 ENCOUNTER — TELEPHONE (OUTPATIENT)
Dept: SURGERY | Facility: CLINIC | Age: 34
End: 2024-03-04

## 2024-03-04 ENCOUNTER — OFFICE VISIT (OUTPATIENT)
Dept: SURGERY | Facility: CLINIC | Age: 34
End: 2024-03-04
Payer: COMMERCIAL

## 2024-03-04 ENCOUNTER — LAB ENCOUNTER (OUTPATIENT)
Dept: LAB | Facility: HOSPITAL | Age: 34
End: 2024-03-04
Attending: SURGERY
Payer: COMMERCIAL

## 2024-03-04 VITALS — BODY MASS INDEX: 29 KG/M2 | WEIGHT: 165 LBS

## 2024-03-04 DIAGNOSIS — D17.23 BENIGN LIPOMATOUS NEOPLASM OF SKIN, SUBCU OF RIGHT LEG: ICD-10-CM

## 2024-03-04 DIAGNOSIS — D17.1 LIPOMA OF TORSO: ICD-10-CM

## 2024-03-04 DIAGNOSIS — D17.24 LIPOMA OF LEFT THIGH: Primary | ICD-10-CM

## 2024-03-04 DIAGNOSIS — D17.1 LIPOMA OF TORSO: Primary | ICD-10-CM

## 2024-03-04 LAB
ANION GAP SERPL CALC-SCNC: 1 MMOL/L (ref 0–18)
BASOPHILS # BLD AUTO: 0.05 X10(3) UL (ref 0–0.2)
BASOPHILS NFR BLD AUTO: 0.7 %
BUN BLD-MCNC: 15 MG/DL (ref 9–23)
BUN/CREAT SERPL: 21.1 (ref 10–20)
CALCIUM BLD-MCNC: 9.6 MG/DL (ref 8.7–10.4)
CHLORIDE SERPL-SCNC: 108 MMOL/L (ref 98–112)
CO2 SERPL-SCNC: 27 MMOL/L (ref 21–32)
CREAT BLD-MCNC: 0.71 MG/DL
DEPRECATED RDW RBC AUTO: 42.4 FL (ref 35.1–46.3)
EGFRCR SERPLBLD CKD-EPI 2021: 115 ML/MIN/1.73M2 (ref 60–?)
EOSINOPHIL # BLD AUTO: 0.22 X10(3) UL (ref 0–0.7)
EOSINOPHIL NFR BLD AUTO: 3 %
ERYTHROCYTE [DISTWIDTH] IN BLOOD BY AUTOMATED COUNT: 13.9 % (ref 11–15)
FASTING STATUS PATIENT QL REPORTED: YES
GLUCOSE BLD-MCNC: 91 MG/DL (ref 70–99)
HCT VFR BLD AUTO: 45.7 %
HGB BLD-MCNC: 15.4 G/DL
IMM GRANULOCYTES # BLD AUTO: 0.02 X10(3) UL (ref 0–1)
IMM GRANULOCYTES NFR BLD: 0.3 %
LYMPHOCYTES # BLD AUTO: 2.76 X10(3) UL (ref 1–4)
LYMPHOCYTES NFR BLD AUTO: 37.8 %
MCH RBC QN AUTO: 28.2 PG (ref 26–34)
MCHC RBC AUTO-ENTMCNC: 33.7 G/DL (ref 31–37)
MCV RBC AUTO: 83.7 FL
MONOCYTES # BLD AUTO: 0.58 X10(3) UL (ref 0.1–1)
MONOCYTES NFR BLD AUTO: 7.9 %
NEUTROPHILS # BLD AUTO: 3.68 X10 (3) UL (ref 1.5–7.7)
NEUTROPHILS # BLD AUTO: 3.68 X10(3) UL (ref 1.5–7.7)
NEUTROPHILS NFR BLD AUTO: 50.3 %
OSMOLALITY SERPL CALC.SUM OF ELEC: 282 MOSM/KG (ref 275–295)
PLATELET # BLD AUTO: 381 10(3)UL (ref 150–450)
POTASSIUM SERPL-SCNC: 4.5 MMOL/L (ref 3.5–5.1)
RBC # BLD AUTO: 5.46 X10(6)UL
SODIUM SERPL-SCNC: 136 MMOL/L (ref 136–145)
WBC # BLD AUTO: 7.3 X10(3) UL (ref 4–11)

## 2024-03-04 PROCEDURE — 36415 COLL VENOUS BLD VENIPUNCTURE: CPT

## 2024-03-04 PROCEDURE — 99213 OFFICE O/P EST LOW 20 MIN: CPT | Performed by: SURGERY

## 2024-03-04 PROCEDURE — 85025 COMPLETE CBC W/AUTO DIFF WBC: CPT

## 2024-03-04 PROCEDURE — 80048 BASIC METABOLIC PNL TOTAL CA: CPT

## 2024-03-04 NOTE — TELEPHONE ENCOUNTER
Pt calling stating that she has FMLA forms to be completed for upcoming surgery on 3/28/24 with Dr Chavez. Pt states she will need 2 weeks. Pt given information to email forms to forms dept.     Type of Leave: Continuous FMLA  Reason for Leave: Surgery on 3/28/24  Start date of leave: 3/28/24  How much time needed?: 2 weeks   Forms Due Date: unsure   Was Fee and Turnaround info Given?: yes

## 2024-03-04 NOTE — H&P (VIEW-ONLY)
History and Physical      HPI     Chief Complaint   Patient presents with    Lump     Patient is here to discuss surgery.  Multiple lipomas on bilateral thighs.  Denies pain, drainage or fevers.       HPI  Juan Gutierrez is a 33 year old female who presents with multiple lipomas of the lower extremities and a large right buttock lipoma measuring 8 cm.  They are painful and she desires excision.  Smaller lipomas of the abdomen she desires to observe at this point.    Past Medical History:   Diagnosis Date    Migraine      Past Surgical History:   Procedure Laterality Date    RHINOPHOTOTHERAPY APPLICATION LIGHT BILATERAL      rhinoplasty     Current Outpatient Medications   Medication Sig Dispense Refill    ciprofloxacin 0.3 % Ophthalmic Solution INSTILL 1 DROP INTO BOTH EYES EVERY 4 HOURS FOR 7 DAYS      Erenumab-aooe (AIMOVIG) 140 MG/ML SC SOAJ Inject 1 mL (140 mg total) into the skin every 30 (thirty) days. 1 pen 5    SUMAtriptan Succinate 100 MG Oral Tab Use at onset; repeat once after 2 HRS-ONLY 2 IN 24 HR MAX.  This is a 30 day supply. 9 tablet 5     ALLERGIES  No Known Allergies    Social History     Socioeconomic History    Marital status: Single   Tobacco Use    Smoking status: Never    Smokeless tobacco: Never    Tobacco comments:     hookah   Vaping Use    Vaping Use: Never used   Substance and Sexual Activity    Alcohol use: Never    Drug use: No     History reviewed. No pertinent family history.    Review of Systems   A comprehensive 10 point review of systems was completed.  Pertinent positives and negatives noted in the the HPI.    PHYSICAL EXAM   Wt 165 lb (74.8 kg)   LMP 02/19/2024 (Approximate)   BMI 29.23 kg/m²  Patient's last menstrual period was 02/19/2024 (approximate).   Constitutional: appears well hydrated alert and responsive no acute distress noted  Head/Face: normocephalic  Nose/Mouth/Throat: nose and throat are clear palate is intact mucous membranes are moist no oral lesions are  noted  Neck/Thyroid: neck is supple without adenopathy  Respiratory: normal to inspection lungs are clear to auscultation bilaterally normal respiratory effort  Cardiovascular: regular rate and rhythm no murmurs, gallups, or rubs  Abdomen: soft non-tender non-distended no organomegaly noted no masses  Extremities: no edema, cyanosis, or clubbing    Multiple lipomas bilateral thighs ranging in size from 6 cm to 4 cm.  Neurological: exam appropriate for age reflexes and motor skills appropriate for age      ASSESSMENT/PLAN   Assessment   Encounter Diagnosis   Name Primary?    Lipoma of torso Yes       33 year old female with multiple lower extremity lipomas  We have discussed the surgical risks, benefits, alternatives, and expected recovery. We will plan excision lipomas in same-day surgery. All of the patient's questions have been answered to her satisfaction.       3/4/2024  Nando Chavez MD

## 2024-03-04 NOTE — TELEPHONE ENCOUNTER
Patient is scheduled for surgery with Dr. Chavez 3-.  She will require medical leave for   one to two weeks following the surgery.  Her diagnosis is Lipoma's and multiple (20).   CPT 11406  X 20  [place]

## 2024-03-04 NOTE — PATIENT INSTRUCTIONS
Avoid aspirin and NSAIDs for 5 days prior to surgery.  Same-day surgery will call with instructions.

## 2024-03-14 NOTE — TELEPHONE ENCOUNTER
Dr. Chavez,     *The ACKNOWLEDGE button has been moved to the top right ribbon*    Please sign off on form if you agree to:  Continuous FMLA (sx & recovery), start date: 3/28/24, end date: 4/11/24  (place your signature on the first page only)    -From your Inbasket, Highlight the patient and click Chart   -Double click the 3/4/24 Forms Completion telephone encounter  -Scroll down to the Media section   -Click the blue Hyperlink:  FMLA Dr. Chavez 3/14/24  -Click Acknowledge located in the top right ribbon/menu   -Drag the mouse into the blank space of the document and a + sign will appear. Left click to   electronically sign the document.     Thank you,    Priscilla SMITH

## 2024-03-25 RX ORDER — ACETAMINOPHEN 500 MG
500 TABLET ORAL EVERY 6 HOURS PRN
COMMUNITY

## 2024-03-25 NOTE — DISCHARGE INSTRUCTIONS
Ice pack as needed.    May shower in 24 hours.  Ibuprofen or Tylenol for pain, Norco if severe  Avoid vigorous activity for 1 week.    Follow-up with Matteo in 2 weeks     HOME INSTRUCTIONS  [facility] HOME CARE INSTRUCTIONS: POST-OP ANESTHESIA  The medication that you received for sedation or general anesthesia can last up to 24 hours. Your judgment and reflexes may be altered, even if you feel like your normal self.      We Recommend:   Do not drive any motor vehicle or bicycle   Avoid mowing the lawn, playing sports, or working with power tools/applicances (power saws, electric knives or mixers)   That you have someone stay with you on your first night home   Do not drink alcohol or take sleeping pills or tranquilizers   Do not sign legal documents within 24 hours of your procedure   If you had a nerve block for your surgery, take extra care not to put any pressure on your arm or hand for 24 hours    It is normal:  For you to have a sore throat if you had a breathing tube during surgery (while you were asleep!). The sore throat should get better within 48 hours. You can gargle with warm salt water (1/2 tsp in 4 oz warm water) or use a throat lozenge for comfort  To feel muscle aches or soreness especially in the abdomen, chest or neck. The achy feeling should go away in the next 24 hours  To feel weak, sleepy or \"wiped out\". Your should start feeling better in the next 24 hours.   To experience mild discomforts such as sore lip or tongue, headache, cramps, gas pains or a bloated feeling in your abdomen.   To experience mild back pain or soreness for a day or two if you had spinal or epidural anesthesia.   If you had laparoscopic surgery, to feel shoulder pain or discomfort on the day of surgery.   For some patients to have nausea after surgery/anesthesia    If you feel nausea or experience vomiting:   Try to move around less.   Eat less than usual or drink only liquids until the next morning   Nausea should resolve in  about 24 hours    If you have a problem when you are at home:    Call your surgeons office   Discharge Instructions: After Your Surgery  You’ve just had surgery. During surgery, you were given medicine called anesthesia to keep you relaxed and free of pain. After surgery, you may have some pain or nausea. This is common. Here are some tips for feeling better and getting well after surgery.   Going home  Your healthcare provider will show you how to take care of yourself when you go home. They'll also answer your questions. Have an adult family member or friend drive you home. For the first 24 hours after your surgery:   Don't drive or use heavy equipment.  Don't make important decisions or sign legal papers.  Take medicines as directed.  Don't drink alcohol.  Have someone stay with you, if needed. They can watch for problems and help keep you safe.  Be sure to go to all follow-up visits with your healthcare provider. And rest after your surgery for as long as your provider tells you to.   Coping with pain  If you have pain after surgery, pain medicine will help you feel better. Take it as directed, before pain becomes severe. Also, ask your healthcare provider or pharmacist about other ways to control pain. This might be with heat, ice, or relaxation. And follow any other instructions your surgeon or nurse gives you.      Stay on schedule with your medicine.     Tips for taking pain medicine  To get the best relief possible, remember these points:   Pain medicines can upset your stomach. Taking them with a little food may help.  Most pain relievers taken by mouth need at least 20 to 30 minutes to start to work.  Don't wait till your pain becomes severe before you take your medicine. Try to time your medicine so that you can take it before starting an activity. This might be before you get dressed, go for a walk, or sit down for dinner.  Constipation is a common side effect of some pain medicines. Call your  healthcare provider before taking any medicines such as laxatives or stool softeners to help ease constipation. Also ask if you should skip any foods. Drinking lots of fluids and eating foods such as fruits and vegetables that are high in fiber can also help. Remember, don't take laxatives unless your surgeon has prescribed them.  Drinking alcohol and taking pain medicine can cause dizziness and slow your breathing. It can even be deadly. Don't drink alcohol while taking pain medicine.  Pain medicine can make you react more slowly to things. Don't drive or run machinery while taking pain medicine.  Your healthcare provider may tell you to take acetaminophen to help ease your pain. Ask them how much you're supposed to take each day. Acetaminophen or other pain relievers may interact with your prescription medicines or other over-the-counter (OTC) medicines. Some prescription medicines have acetaminophen and other ingredients in them. Using both prescription and OTC acetaminophen for pain can cause you to accidentally overdose. Read the labels on your OTC medicines with care. This will help you to clearly know the list of ingredients, how much to take, and any warnings. It may also help you not take too much acetaminophen. If you have questions or don't understand the information, ask your pharmacist or healthcare provider to explain it to you before you take the OTC medicine.   Managing nausea  Some people have an upset stomach (nausea) after surgery. This is often because of anesthesia, pain, or pain medicine, less movement of food in the stomach, or the stress of surgery. These tips will help you handle nausea and eat healthy foods as you get better. If you were on a special food plan before surgery, ask your healthcare provider if you should follow it while you get better. Check with your provider on how your eating should progress. It may depend on the surgery you had. These general tips may help:   Don't push  yourself to eat. Your body will tell you when to eat and how much.  Start off with clear liquids and soup. They're easier to digest.  Next try semi-solid foods as you feel ready. These include mashed potatoes, applesauce, and gelatin.  Slowly move to solid foods. Don’t eat fatty, rich, or spicy foods at first.  Don't force yourself to have 3 large meals a day. Instead eat smaller amounts more often.  Take pain medicines with a small amount of solid food, such as crackers or toast. This helps prevent nausea.  When to call your healthcare provider  Call your healthcare provider right away if you have any of these:   You still have too much pain, or the pain gets worse, after taking the medicine. The medicine may not be strong enough. Or there may be a complication from the surgery.  You feel too sleepy, dizzy, or groggy. The medicine may be too strong.  Side effects such as nausea or vomiting. Your healthcare provider may advise taking other medicines to .  Skin changes such as rash, itching, or hives. This may mean you have an allergic reaction. Your provider may advise taking other medicines.  The incision looks different (for instance, part of it opens up).  Bleeding or fluid leaking from the incision site, and weren't told to expect that.  Fever of 100.4°F (38°C) or higher, or as directed by your provider.  Call 911  Call 911 right away if you have:   Trouble breathing  Facial swelling    If you have obstructive sleep apnea   You were given anesthesia medicine during surgery to keep you comfortable and free of pain. After surgery, you may have more apnea spells because of this medicine and other medicines you were given. The spells may last longer than normal.    At home:  Keep using the continuous positive airway pressure (CPAP) device when you sleep. Unless your healthcare provider tells you not to, use it when you sleep, day or night. CPAP is a common device used to treat obstructive sleep apnea.  Talk with  your provider before taking any pain medicine, muscle relaxants, or sedatives. Your provider will tell you about the possible dangers of taking these medicines.  Contact your provider if your sleeping changes a lot even when taking medicines as directed.  Liane last reviewed this educational content on 10/1/2021  © 1876-0139 [employer]. All rights reserved. This information is not intended as a substitute for professional medical care. Always follow your healthcare professional's instructions.

## 2024-03-25 NOTE — TELEPHONE ENCOUNTER
FMLA forms completed & faxed to Angela at # [phone]. Fax confirmation received.    [employer] message sent to pt.

## 2024-03-27 ENCOUNTER — HOSPITAL ENCOUNTER (OUTPATIENT)
Facility: HOSPITAL | Age: 34
Setting detail: HOSPITAL OUTPATIENT SURGERY
Discharge: HOME | End: 2024-03-27
Attending: SURGERY | Admitting: SURGERY
Payer: COMMERCIAL

## 2024-03-27 ENCOUNTER — ANESTHESIA EVENT (OUTPATIENT)
Dept: SURGERY | Facility: HOSPITAL | Age: 34
End: 2024-03-27
Payer: COMMERCIAL

## 2024-03-27 ENCOUNTER — ANESTHESIA (OUTPATIENT)
Dept: SURGERY | Facility: HOSPITAL | Age: 34
End: 2024-03-27
Payer: COMMERCIAL

## 2024-03-27 VITALS
HEART RATE: 58 BPM | SYSTOLIC BLOOD PRESSURE: 112 MMHG | OXYGEN SATURATION: 99 % | BODY MASS INDEX: 32.76 KG/M2 | RESPIRATION RATE: 14 BRPM | DIASTOLIC BLOOD PRESSURE: 68 MMHG | HEIGHT: 62 IN | TEMPERATURE: 98 F | WEIGHT: 178 LBS

## 2024-03-27 DIAGNOSIS — D17.23 BENIGN LIPOMATOUS NEOPLASM OF SKIN, SUBCU OF RIGHT LEG: ICD-10-CM

## 2024-03-27 DIAGNOSIS — D17.24 LIPOMA OF LEFT THIGH: ICD-10-CM

## 2024-03-27 DIAGNOSIS — G89.18 POSTOPERATIVE PAIN: Primary | ICD-10-CM

## 2024-03-27 LAB — B-HCG UR QL: NEGATIVE

## 2024-03-27 PROCEDURE — 88304 TISSUE EXAM BY PATHOLOGIST: CPT | Performed by: SURGERY

## 2024-03-27 PROCEDURE — 81025 URINE PREGNANCY TEST: CPT

## 2024-03-27 PROCEDURE — 0JBL0ZZ EXCISION OF RIGHT UPPER LEG SUBCUTANEOUS TISSUE AND FASCIA, OPEN APPROACH: ICD-10-PCS | Performed by: SURGERY

## 2024-03-27 PROCEDURE — 0JBM0ZZ EXCISION OF LEFT UPPER LEG SUBCUTANEOUS TISSUE AND FASCIA, OPEN APPROACH: ICD-10-PCS | Performed by: SURGERY

## 2024-03-27 RX ORDER — MORPHINE SULFATE 4 MG/ML
4 INJECTION, SOLUTION INTRAMUSCULAR; INTRAVENOUS EVERY 10 MIN PRN
Status: DISCONTINUED | OUTPATIENT
Start: 2024-03-27 | End: 2024-03-27

## 2024-03-27 RX ORDER — HYDROCODONE BITARTRATE AND ACETAMINOPHEN 5; 325 MG/1; MG/1
1 TABLET ORAL EVERY 6 HOURS PRN
Qty: 10 TABLET | Refills: 0 | Status: SHIPPED | OUTPATIENT
Start: 2024-03-27

## 2024-03-27 RX ORDER — LIDOCAINE HYDROCHLORIDE 10 MG/ML
INJECTION, SOLUTION EPIDURAL; INFILTRATION; INTRACAUDAL; PERINEURAL AS NEEDED
Status: DISCONTINUED | OUTPATIENT
Start: 2024-03-27 | End: 2024-03-27 | Stop reason: SURG

## 2024-03-27 RX ORDER — HYDROCODONE BITARTRATE AND ACETAMINOPHEN 5; 325 MG/1; MG/1
1 TABLET ORAL AS NEEDED
Status: DISCONTINUED | OUTPATIENT
Start: 2024-03-27 | End: 2024-03-27

## 2024-03-27 RX ORDER — GLYCOPYRROLATE 0.2 MG/ML
INJECTION, SOLUTION INTRAMUSCULAR; INTRAVENOUS AS NEEDED
Status: DISCONTINUED | OUTPATIENT
Start: 2024-03-27 | End: 2024-03-27 | Stop reason: SURG

## 2024-03-27 RX ORDER — SODIUM CHLORIDE, SODIUM LACTATE, POTASSIUM CHLORIDE, CALCIUM CHLORIDE 600; 310; 30; 20 MG/100ML; MG/100ML; MG/100ML; MG/100ML
INJECTION, SOLUTION INTRAVENOUS CONTINUOUS
Status: DISCONTINUED | OUTPATIENT
Start: 2024-03-27 | End: 2024-03-27

## 2024-03-27 RX ORDER — LABETALOL HYDROCHLORIDE 5 MG/ML
INJECTION, SOLUTION INTRAVENOUS AS NEEDED
Status: DISCONTINUED | OUTPATIENT
Start: 2024-03-27 | End: 2024-03-27 | Stop reason: SURG

## 2024-03-27 RX ORDER — ROCURONIUM BROMIDE 10 MG/ML
INJECTION, SOLUTION INTRAVENOUS AS NEEDED
Status: DISCONTINUED | OUTPATIENT
Start: 2024-03-27 | End: 2024-03-27 | Stop reason: SURG

## 2024-03-27 RX ORDER — MORPHINE SULFATE 10 MG/ML
6 INJECTION, SOLUTION INTRAMUSCULAR; INTRAVENOUS EVERY 10 MIN PRN
Status: DISCONTINUED | OUTPATIENT
Start: 2024-03-27 | End: 2024-03-27

## 2024-03-27 RX ORDER — CEFAZOLIN SODIUM/WATER 2 G/20 ML
2 SYRINGE (ML) INTRAVENOUS ONCE
Status: COMPLETED | OUTPATIENT
Start: 2024-03-27 | End: 2024-03-27

## 2024-03-27 RX ORDER — ACETAMINOPHEN 500 MG
1000 TABLET ORAL ONCE
Status: COMPLETED | OUTPATIENT
Start: 2024-03-27 | End: 2024-03-27

## 2024-03-27 RX ORDER — MIDAZOLAM HYDROCHLORIDE 1 MG/ML
INJECTION INTRAMUSCULAR; INTRAVENOUS AS NEEDED
Status: DISCONTINUED | OUTPATIENT
Start: 2024-03-27 | End: 2024-03-27 | Stop reason: SURG

## 2024-03-27 RX ORDER — METOCLOPRAMIDE 10 MG/1
10 TABLET ORAL ONCE
Status: COMPLETED | OUTPATIENT
Start: 2024-03-27 | End: 2024-03-27

## 2024-03-27 RX ORDER — DEXAMETHASONE SODIUM PHOSPHATE 4 MG/ML
VIAL (ML) INJECTION AS NEEDED
Status: DISCONTINUED | OUTPATIENT
Start: 2024-03-27 | End: 2024-03-27 | Stop reason: SURG

## 2024-03-27 RX ORDER — ONDANSETRON 2 MG/ML
INJECTION INTRAMUSCULAR; INTRAVENOUS AS NEEDED
Status: DISCONTINUED | OUTPATIENT
Start: 2024-03-27 | End: 2024-03-27 | Stop reason: SURG

## 2024-03-27 RX ORDER — HYDROMORPHONE HYDROCHLORIDE 1 MG/ML
0.6 INJECTION, SOLUTION INTRAMUSCULAR; INTRAVENOUS; SUBCUTANEOUS EVERY 5 MIN PRN
Status: DISCONTINUED | OUTPATIENT
Start: 2024-03-27 | End: 2024-03-27

## 2024-03-27 RX ORDER — FAMOTIDINE 20 MG/1
20 TABLET, FILM COATED ORAL ONCE
Status: COMPLETED | OUTPATIENT
Start: 2024-03-27 | End: 2024-03-27

## 2024-03-27 RX ORDER — MORPHINE SULFATE 4 MG/ML
2 INJECTION, SOLUTION INTRAMUSCULAR; INTRAVENOUS EVERY 10 MIN PRN
Status: DISCONTINUED | OUTPATIENT
Start: 2024-03-27 | End: 2024-03-27

## 2024-03-27 RX ORDER — HYDROMORPHONE HYDROCHLORIDE 1 MG/ML
0.2 INJECTION, SOLUTION INTRAMUSCULAR; INTRAVENOUS; SUBCUTANEOUS EVERY 5 MIN PRN
Status: DISCONTINUED | OUTPATIENT
Start: 2024-03-27 | End: 2024-03-27

## 2024-03-27 RX ORDER — HYDROMORPHONE HYDROCHLORIDE 1 MG/ML
0.4 INJECTION, SOLUTION INTRAMUSCULAR; INTRAVENOUS; SUBCUTANEOUS EVERY 5 MIN PRN
Status: DISCONTINUED | OUTPATIENT
Start: 2024-03-27 | End: 2024-03-27

## 2024-03-27 RX ORDER — METOCLOPRAMIDE HYDROCHLORIDE 5 MG/ML
10 INJECTION INTRAMUSCULAR; INTRAVENOUS ONCE
Status: COMPLETED | OUTPATIENT
Start: 2024-03-27 | End: 2024-03-27

## 2024-03-27 RX ORDER — IBUPROFEN 600 MG/1
600 TABLET ORAL EVERY 6 HOURS PRN
Qty: 15 TABLET | Refills: 1 | Status: SHIPPED | OUTPATIENT
Start: 2024-03-27 | End: 2024-04-03

## 2024-03-27 RX ORDER — FAMOTIDINE 10 MG/ML
20 INJECTION, SOLUTION INTRAVENOUS ONCE
Status: COMPLETED | OUTPATIENT
Start: 2024-03-27 | End: 2024-03-27

## 2024-03-27 RX ORDER — NALOXONE HYDROCHLORIDE 0.4 MG/ML
80 INJECTION, SOLUTION INTRAMUSCULAR; INTRAVENOUS; SUBCUTANEOUS AS NEEDED
Status: DISCONTINUED | OUTPATIENT
Start: 2024-03-27 | End: 2024-03-27

## 2024-03-27 RX ADMIN — ONDANSETRON 4 MG: 2 INJECTION INTRAMUSCULAR; INTRAVENOUS at 07:29:00

## 2024-03-27 RX ADMIN — SODIUM CHLORIDE, SODIUM LACTATE, POTASSIUM CHLORIDE, CALCIUM CHLORIDE: 600; 310; 30; 20 INJECTION, SOLUTION INTRAVENOUS at 08:03:00

## 2024-03-27 RX ADMIN — DEXAMETHASONE SODIUM PHOSPHATE 4 MG: 4 MG/ML VIAL (ML) INJECTION at 07:29:00

## 2024-03-27 RX ADMIN — LABETALOL HYDROCHLORIDE 5 MG: 5 INJECTION, SOLUTION INTRAVENOUS at 07:52:00

## 2024-03-27 RX ADMIN — MIDAZOLAM HYDROCHLORIDE 2 MG: 1 INJECTION INTRAMUSCULAR; INTRAVENOUS at 07:25:00

## 2024-03-27 RX ADMIN — GLYCOPYRROLATE 0.2 MG: 0.2 INJECTION, SOLUTION INTRAMUSCULAR; INTRAVENOUS at 07:29:00

## 2024-03-27 RX ADMIN — LIDOCAINE HYDROCHLORIDE 50 MG: 10 INJECTION, SOLUTION EPIDURAL; INFILTRATION; INTRACAUDAL; PERINEURAL at 07:29:00

## 2024-03-27 RX ADMIN — CEFAZOLIN SODIUM/WATER 2 G: 2 G/20 ML SYRINGE (ML) INTRAVENOUS at 07:33:00

## 2024-03-27 RX ADMIN — ROCURONIUM BROMIDE 30 MG: 10 INJECTION, SOLUTION INTRAVENOUS at 07:29:00

## 2024-03-27 NOTE — ANESTHESIA PROCEDURE NOTES
Airway  Date/Time: 3/27/2024 7:39 AM  Urgency: Elective      General Information and Staff    Patient location during procedure: OR  Anesthesiologist: Jerod De La Cruz MD  Performed: anesthesiologist   Performed by: Jerod De La Cruz MD  Authorized by: Jerod De La Cruz MD      Indications and Patient Condition  Indications for airway management: anesthesia  Sedation level: deep  Preoxygenated: yes  Patient position: sniffing  Mask difficulty assessment: 1 - vent by mask    Final Airway Details  Final airway type: endotracheal airway      Successful airway: ETT  Cuffed: yes   Successful intubation technique: direct laryngoscopy  Endotracheal tube insertion site: oral  Blade: Steve  ETT size (mm): 7.0    Placement verified by: capnometry   Measured from: teeth  Number of attempts at approach: 1

## 2024-03-27 NOTE — ANESTHESIA POSTPROCEDURE EVALUATION
Patient: Juan Gutierrez    Procedure Summary       Date: 03/27/24 Room / Location: [facility] MAIN OR  / [facility] MAIN OR    Anesthesia Start: 0725 Anesthesia Stop: 0828    Procedure: Excision of  lipomas, x11 on the upper left thigh and 10 on the upper right thigh (Bilateral: Leg Upper) Diagnosis:       Lipoma of left thigh      Benign lipomatous neoplasm of skin, subcu of right leg      (Lipoma of left thigh [D17.24]Benign lipomatous neoplasm of skin, subcu of right leg [D17.23])    Surgeons: Nando Chavez MD Anesthesiologist: Ray De La Cruz MD    Anesthesia Type: general ASA Status: 2            Anesthesia Type: general    Vitals Value Taken Time   /80 03/27/24 0825   Temp 97.4 03/27/24 0828   Pulse 87 03/27/24 0827   Resp 25 03/27/24 0827   SpO2 95 03/27/24 0827   Vitals shown include unfiled device data.    [facility] AN Post Evaluation:   Patient Evaluated in PACU  Patient Participation: complete - patient participated  Level of Consciousness: awake  Pain Management: adequate  Airway Patency:patent  Dental exam unchanged from preop  Yes    Cardiovascular Status: acceptable  Respiratory Status: acceptable  Postoperative Hydration acceptable      RAY DE LA CRUZ MD  3/27/2024 8:28 AM

## 2024-03-27 NOTE — ANESTHESIA PREPROCEDURE EVALUATION
Anesthesia PreOp Note    HPI:     Juan Gutierrez is a 33 year old female who presents for preoperative consultation requested by: Nando Chavez MD    Date of Surgery: 3/27/2024    Procedure(s):  Excision of  lipomas, x11 on the upper left thigh and 9 on the upper right thigh  Indication: Lipoma of left thigh [D17.24]  Benign lipomatous neoplasm of skin, subcu of right leg [D17.23]    Relevant Problems   No relevant active problems       NPO:  Last Liquid Consumption Date: 03/26/24  Last Liquid Consumption Time: 2355  Last Solid Consumption Date: 03/26/24  Last Solid Consumption Time: 2100  Last Liquid Consumption Date: 03/26/24          History Review:  Patient Active Problem List    Diagnosis Date Noted    Migraine 01/30/2020       Past Medical History:   Diagnosis Date    Migraine     Migraines        Past Surgical History:   Procedure Laterality Date    RHINOPHOTOTHERAPY APPLICATION LIGHT BILATERAL      rhinoplasty       Medications Prior to Admission   Medication Sig Dispense Refill Last Dose    acetaminophen 500 MG Oral Tab Take 1 tablet (500 mg total) by mouth every 6 (six) hours as needed for Pain.   3/25/2024     Current Facility-Administered Medications Ordered in Epic   Medication Dose Route Frequency Provider Last Rate Last Admin    lactated ringers infusion   Intravenous Continuous Nando Chavez MD 20 mL/hr at 03/27/24 0701 New Bag at 03/27/24 0701    ceFAZolin (Ancef) 2 g in 20mL IV syringe premix  2 g Intravenous Once Nando Chavez MD         No current [facility]-ordered outpatient medications on file.       No Known Allergies    History reviewed. No pertinent family history.  Social History     Socioeconomic History    Marital status: Single   Tobacco Use    Smoking status: Never    Smokeless tobacco: Never    Tobacco comments:     hookah   Vaping Use    Vaping Use: Never used   Substance and Sexual Activity    Alcohol use: Never    Drug use: No       Available pre-op labs reviewed.  Lab Results    Component Value Date    WBC 7.3 03/04/2024    RBC 5.46 (H) 03/04/2024    HGB 15.4 03/04/2024    HCT 45.7 03/04/2024    MCV 83.7 03/04/2024    MCH 28.2 03/04/2024    MCHC 33.7 03/04/2024    RDW 13.9 03/04/2024    .0 03/04/2024    URINEPREG Negative 03/27/2024     Lab Results   Component Value Date     03/04/2024    K 4.5 03/04/2024     03/04/2024    CO2 27.0 03/04/2024    BUN 15 03/04/2024    CREATSERUM 0.71 03/04/2024    GLU 91 03/04/2024    CA 9.6 03/04/2024          Vital Signs:  Body mass index is 32.56 kg/m².   height is 1.575 m (5' 2\") and weight is 80.7 kg (178 lb). Her oral temperature is 97.4 °F (36.3 °C). Her blood pressure is 127/73 and her pulse is 70. Her respiration is 20 and oxygen saturation is 97%.   Vitals:    03/25/24 1122 03/27/24 0622   BP:  127/73   Pulse:  70   Resp:  20   Temp:  97.4 °F (36.3 °C)   TempSrc:  Oral   SpO2:  97%   Weight: 77.1 kg (170 lb) 80.7 kg (178 lb)   Height: 1.6 m (5' 3\") 1.575 m (5' 2\")        Anesthesia Evaluation     Patient summary reviewed and Nursing notes reviewed    No history of anesthetic complications   Airway   Mallampati: II  Neck ROM: full  Dental      Pulmonary - negative ROS and normal exam   Cardiovascular - negative ROS and normal exam    Neuro/Psych - negative ROS     GI/Hepatic/Renal - negative ROS     Endo/Other - negative ROS   Abdominal  - normal exam  (+) obese                 Anesthesia Plan:   ASA:  2  Plan:   General  Airway:  ETT  Post-op Pain Management: IV analgesics  Informed Consent Plan and Risks Discussed With:  Patient      I have informed Juan Gutierrez and/or legal guardian or family member of the nature of the anesthetic plan, benefits, risks including possible dental damage if relevant, major complications, and any alternative forms of anesthetic management.   All of the patient's questions were answered to the best of my ability. The patient desires the anesthetic management as planned.  RAY PERALTA,  MD  3/27/2024 7:19 AM  Present on Admission:  **None**       [phone]

## 2024-03-27 NOTE — INTERVAL H&P NOTE
Pre-op Diagnosis: Lipoma of left thigh [D17.24]  Benign lipomatous neoplasm of skin, subcu of right leg [D17.23]    The above referenced H&P was reviewed by Nando Chavez MD on 3/27/2024, the patient was examined and no significant changes have occurred in the patient's condition since the H&P was performed.  I discussed with the patient and/or legal representative the potential benefits, risks and side effects of this procedure; the likelihood of the patient achieving goals; and potential problems that might occur during recuperation.  I discussed reasonable alternatives to the procedure, including risks, benefits and side effects related to the alternatives and risks related to not receiving this procedure.  We will proceed with procedure as planned.

## 2024-03-27 NOTE — OPERATIVE REPORT
Call back regarding a program RN Care Coordinator program. Please call  This about her father, herself and Mother.   [facility]    PATIENT'S NAME: MAXINE GIL   ATTENDING PHYSICIAN: Nando Chavez MD   OPERATING PHYSICIAN: Nando Chavez MD   PATIENT ACCOUNT#:   [id number]    LOCATION:  [facility] PACU 4 [facility] 10  MEDICAL RECORD #:   [id number]       YOB: 1990  ADMISSION DATE:       03/27/2024      OPERATION DATE:  03/27/2024    OPERATIVE REPORT      PREOPERATIVE DIAGNOSIS:  Bilateral thigh lipomas; 11 on the left, 10 on the right.  POSTOPERATIVE DIAGNOSIS:  Bilateral thigh lipomas; 11 on the left, 10 on the right.  PROCEDURE:  Excision of 21 lipomas with intermittent layered closure; 10 measuring 2 cm, 5 measuring 1 cm, 6 measuring 3 cm.    ASSISTANT:  SAMI Daniel    ESTIMATED BLOOD LOSS:  10 mL.    COMPLICATIONS:  None.    ANESTHESIA:  General.    DISPOSITION:  To Recovery, tolerated well.    INDICATIONS:  The patient is 33, with symptomatic enlarging angiolipomas.  Consent obtained.    OPERATIVE TECHNIQUE:  She was taken surgery, was prepped and draped in usual sterile fashion.  The same technique was used to remove all.  Incision was made using a 15-blade scalpel.  The angiolipomas dissected using sharp dissection, completely removed.  The wounds were irrigated, closed using interrupted 3-0 Monocryl and Dermabond.  All 21 were removed.  Hemostasis assured.  She tolerated this well.     Dictated By Nando Chavez MD  d: 03/27/2024 08:18:32  t: 03/27/2024 08:55:16  Job [id number]  GL/    cc: Darrell Izaguirre MD

## 2024-04-09 ENCOUNTER — TELEPHONE (OUTPATIENT)
Dept: SURGERY | Facility: CLINIC | Age: 34
End: 2024-04-09

## 2024-04-09 NOTE — TELEPHONE ENCOUNTER
Per pt needs post op appt, had surgery on 3/27, no openings found until 4/22. Please call thank you.

## 2024-04-15 ENCOUNTER — OFFICE VISIT (OUTPATIENT)
Dept: SURGERY | Facility: CLINIC | Age: 34
End: 2024-04-15
Payer: COMMERCIAL

## 2024-04-15 VITALS — WEIGHT: 178 LBS | BODY MASS INDEX: 33 KG/M2

## 2024-04-15 DIAGNOSIS — Z48.89 ENCOUNTER FOR POSTOPERATIVE CARE: Primary | ICD-10-CM

## 2024-04-15 PROCEDURE — 99024 POSTOP FOLLOW-UP VISIT: CPT

## 2024-04-16 ENCOUNTER — TELEPHONE (OUTPATIENT)
Dept: SURGERY | Facility: CLINIC | Age: 34
End: 2024-04-16

## 2024-04-16 NOTE — TELEPHONE ENCOUNTER
Pt states that she is returning a call from this office regarding her appt today.  Please call.

## 2024-04-16 NOTE — PROGRESS NOTES
S:  Juan Gutierrez is a 33 year old female sp multiple lipoma excision with Dr. Chavez.  Doing well, no fevers, no chills, tolerating a general diet, generally normal bowel movements, no calf tenderness nor lower extremity edema, no shortness of breath, no chest pain.   Reports there is another lipoma that was missed and she would like removed.    O:  Wt 178 lb (80.7 kg)   LMP 03/25/2024 (Approximate)   BMI 32.56 kg/m²   GEN:  No acute distress  L: nonlabored respirations  H: reg rate  Abd:  Soft, NT,ND.  Skin: Incisions C D I, no eryth  Extr: No edema, no calf tenderness    Path:  Reviewed w pt    Assessment   1. Encounter for postoperative care        Doing well post op  Continue to keep incision clean and dry.  Maintain a healthy diet.  Maintain good hydration.  F/u with Dr. Chavez for removal of the last lipoma.         Matteo Yo PA-C

## 2024-04-19 ENCOUNTER — TELEPHONE (OUTPATIENT)
Dept: SURGERY | Facility: CLINIC | Age: 34
End: 2024-04-19

## 2024-04-19 NOTE — TELEPHONE ENCOUNTER
Patient requires a note for work for procedure on Monday and their return date will be Monday May 2nd. Please advise.

## 2024-04-22 ENCOUNTER — OFFICE VISIT (OUTPATIENT)
Dept: SURGERY | Facility: CLINIC | Age: 34
End: 2024-04-22
Payer: COMMERCIAL

## 2024-04-22 VITALS
SYSTOLIC BLOOD PRESSURE: 135 MMHG | BODY MASS INDEX: 33 KG/M2 | WEIGHT: 178 LBS | DIASTOLIC BLOOD PRESSURE: 95 MMHG | HEART RATE: 71 BPM

## 2024-04-22 DIAGNOSIS — D17.9 LIPOMA, UNSPECIFIED SITE: Primary | ICD-10-CM

## 2024-04-22 RX ORDER — IBUPROFEN 600 MG/1
600 TABLET ORAL EVERY 6 HOURS PRN
Qty: 15 TABLET | Refills: 1 | Status: SHIPPED | OUTPATIENT
Start: 2024-04-22 | End: 2024-04-29

## 2024-04-22 NOTE — PROGRESS NOTES
Surgery note    Patient here to have left buttock lipoma excised.  This was missed at the original surgery.  Area prepped and draped with Betadine in sterile fashion 1% lidocaine with epinephrine is infiltrated.  5 cm incision is made and a 8 cm lipoma is excised.  Hemostasis maintained with compression incision closed in layers with 2-0 chromic 3-0 Monocryl benzoin Steri-Strips.

## 2024-04-22 NOTE — PATIENT INSTRUCTIONS
Ice pack as needed.  Leave outer dressing for 48 hours.  Okay to shower tomorrow.    Leave white strips in place for 1 week.  Call for problems    Ibuprofen sent to your pharmacy

## 2024-05-02 ENCOUNTER — TELEPHONE (OUTPATIENT)
Dept: SURGERY | Facility: CLINIC | Age: 34
End: 2024-05-02

## 2024-05-02 NOTE — TELEPHONE ENCOUNTER
New TE started - refer to TE 03/04/24 for forms completion HX      Patient called forms dept states need RTW ASAP for she can start work Monday without forms she cannot RTW- Letter in patient chart advising restrictions- Will task MD about patient restrictions on repeated bending and stooping as patient states this causes her pain- forms placed in my Inbox to complete for patient due to urgency advised patient I can work on her forms tomorrow- verbal understanding from patient

## 2024-05-02 NOTE — TELEPHONE ENCOUNTER
Dr Chavez,    Patient submitted a RTW form, I see the provided letter for patient about her restrictions placed for 4 weeks, I will be adding restrictions to patient RTW forms. Patient also mentioned that she cannot do any stooping or repeated bending. Is it ok to add patient is fully restricted from these physical activity as well for the duration of the 4 weeks?     Thank you,    Hedy

## 2024-05-02 NOTE — TELEPHONE ENCOUNTER
Patient would like note for work to be amended to state she may return to work on Monday the 6th of May and to state as well she will be on light duty for the month instead of two weeks. Patient asked if this would be able to be done today so she can turn it into her employer. Please advise.

## 2024-05-02 NOTE — TELEPHONE ENCOUNTER
Dr. Chavez,    This patient received the letter I wrote and now requires a medical leave packet to be completed.  I gave the number to the forms department.  I will inform the provider to be prepared to sign off.  Patient states she needs this before she can return to work.  Charlette

## 2024-05-02 NOTE — TELEPHONE ENCOUNTER
Per patient there is a problem with the work note and asking to speak to Charlette, states it is urgent. Please advise

## 2024-05-03 NOTE — TELEPHONE ENCOUNTER
Please try to avoid signing forms in the corner as it is not visible when printing and forms are not accepted this way. Thank you!     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 She should work sedentary and avoid pushing, pulling and lifting for four weeks.    She can return to work on full duty without restrictions on Monday, June 6, 2024.   (place your signature on the first page only)    -From your Inbasket, Highlight the patient and click Chart   -Double click the 05/02/24 Forms Completion telephone encounter  -Scroll down to the Media section   -Click the blue Hyperlink: RTW Dr Chavez 05/03/24  -Click Acknowledge located in the top right ribbon/menu   -Drag the mouse into the blank space of the document and a + sign will appear. Left click to   electronically sign the document.     Thank you,       Hedy

## 2024-05-03 NOTE — TELEPHONE ENCOUNTER
Juan Barnes called and needs these FMLA forms signed ASAP (to go back to work Monday)    Please sign   THX Charlette

## 2024-05-06 NOTE — TELEPHONE ENCOUNTER
Please try to avoid signing forms in the corner as it is not visible when printing and forms are not accepted this way. Thank you!     Dr. Chavez,        My apologies but signature is not on previous forms. Can you please sign forms again.      *The ACKNOWLEDGE button has been moved to the top right ribbon*        Please sign off on form if you agree to:  RTW-   Return to work on Monday, May 6th on light duty. She should work sedentary and avoid pushing, pulling and lifting for four weeks.    She can return to work on full duty without restrictions on Monday, June 6, 2024.   (place your signature on the first page only)    -From your Inbasket, Highlight the patient and click Chart   -Double click the 5/2/24 Forms Completion telephone encounter  -Scroll down to the Media section   -Click the blue Hyperlink: RTW Dr Chavez 05/06/24    -Click Acknowledge located in the top right ribbon/menu   -Drag the mouse into the blank space of the document and a + sign will appear. Left click to   electronically sign the document.     Thank you,   Dana

## 2024-05-06 NOTE — TELEPHONE ENCOUNTER
Called patient to inform her forms are completed and faxed to Angela at # [phone]. Fax confirmation received. Patient requested a copy uploaded to her Mychart.

## 2024-06-06 ENCOUNTER — OFFICE VISIT (OUTPATIENT)
Dept: INTERNAL MEDICINE CLINIC | Facility: CLINIC | Age: 34
End: 2024-06-06
Payer: COMMERCIAL

## 2024-06-06 VITALS
SYSTOLIC BLOOD PRESSURE: 130 MMHG | WEIGHT: 174 LBS | DIASTOLIC BLOOD PRESSURE: 86 MMHG | HEART RATE: 79 BPM | BODY MASS INDEX: 32.02 KG/M2 | HEIGHT: 62 IN

## 2024-06-06 DIAGNOSIS — Z13.29 THYROID DISORDER SCREEN: ICD-10-CM

## 2024-06-06 DIAGNOSIS — Z12.4 ENCOUNTER FOR SCREENING FOR CERVICAL CANCER: ICD-10-CM

## 2024-06-06 DIAGNOSIS — N76.0 ACUTE VAGINITIS: ICD-10-CM

## 2024-06-06 DIAGNOSIS — N94.10 DYSPAREUNIA, FEMALE: ICD-10-CM

## 2024-06-06 DIAGNOSIS — Z00.00 WELLNESS EXAMINATION: Primary | ICD-10-CM

## 2024-06-06 DIAGNOSIS — Z13.220 LIPID SCREENING: ICD-10-CM

## 2024-06-06 DIAGNOSIS — Z11.3 VENEREAL DISEASE SCREENING: ICD-10-CM

## 2024-06-06 DIAGNOSIS — Z13.21 ENCOUNTER FOR VITAMIN DEFICIENCY SCREENING: ICD-10-CM

## 2024-06-06 PROCEDURE — 3079F DIAST BP 80-89 MM HG: CPT | Performed by: NURSE PRACTITIONER

## 2024-06-06 PROCEDURE — 3075F SYST BP GE 130 - 139MM HG: CPT | Performed by: NURSE PRACTITIONER

## 2024-06-06 PROCEDURE — 99395 PREV VISIT EST AGE 18-39: CPT | Performed by: NURSE PRACTITIONER

## 2024-06-06 PROCEDURE — 99213 OFFICE O/P EST LOW 20 MIN: CPT | Performed by: NURSE PRACTITIONER

## 2024-06-06 PROCEDURE — 3008F BODY MASS INDEX DOCD: CPT | Performed by: NURSE PRACTITIONER

## 2024-06-06 RX ORDER — BROMPHENIRAMIN/PSEUDOEPHEDRINE 1-15MG/5ML
1 LIQUID (ML) ORAL NIGHTLY
Qty: 21 G | Refills: 0 | Status: SHIPPED | OUTPATIENT
Start: 2024-06-06 | End: 2024-06-07

## 2024-06-06 NOTE — PROGRESS NOTES
Juan Gutierrez is a 33 year old female.  HPI:   Pt presents to clinic for annual physical.   She requests vaginal exam, reports she is recently sexually active again after several years and noticed that with intercourse, she occasionally gets pain in her pelvis which resolves immediately after. She reports over past 2 weeks she has had increased sexual activity and noticed her vulva was swollen and red though reports today it has resolved. She denies any itching, discharge, fever, chills, bleeding. Not using any contraceptives.    LMP 2024  Reports hx of migraines.     Current Outpatient Medications   Medication Sig Dispense Refill    acetaminophen 500 MG Oral Tab Take 1 tablet (500 mg total) by mouth every 6 (six) hours as needed for Pain. (Patient not taking: Reported on 2024)        Past Medical History:    Migraine    Migraines      Social History:  Social History     Socioeconomic History    Marital status: Single   Tobacco Use    Smoking status: Never    Smokeless tobacco: Never    Tobacco comments:     hookah   Vaping Use    Vaping status: Never Used   Substance and Sexual Activity    Alcohol use: Never    Drug use: No        REVIEW OF SYSTEMS:   Review of Systems   Constitutional:  Negative for activity change, appetite change, fatigue, fever and unexpected weight change.   HENT:  Negative for congestion, dental problem and sore throat.    Eyes:  Negative for visual disturbance.   Respiratory:  Negative for cough, chest tightness, shortness of breath and wheezing.    Cardiovascular:  Negative for chest pain, palpitations and leg swelling.   Gastrointestinal:  Negative for abdominal pain, constipation, diarrhea, nausea and vomiting.   Endocrine: Negative.    Genitourinary:  Negative for difficulty urinating, frequency, menstrual problem and vaginal bleeding.   Musculoskeletal:  Negative for arthralgias and back pain.   Skin:  Negative for color change, pallor, rash and wound.   Neurological:   Negative for dizziness, seizures, light-headedness, numbness and headaches.   Psychiatric/Behavioral:  Negative for behavioral problems, dysphoric mood and suicidal ideas. The patient is not nervous/anxious.           EXAM:   /86 (BP Location: Left arm, Patient Position: Sitting, Cuff Size: adult)   Pulse 79   Ht 5' 2\" (1.575 m)   Wt 174 lb (78.9 kg)   LMP 05/19/2024 (Approximate)   BMI 31.83 kg/m²     Physical Exam  Vitals reviewed. Chaperone present: chaperone declined.   Constitutional:       General: She is not in acute distress.     Appearance: Normal appearance. She is normal weight. She is not ill-appearing.   HENT:      Head: Normocephalic and atraumatic.      Right Ear: Tympanic membrane, ear canal and external ear normal.      Left Ear: Tympanic membrane, ear canal and external ear normal.      Nose: Nose normal. No congestion.      Mouth/Throat:      Pharynx: Oropharynx is clear. No oropharyngeal exudate or posterior oropharyngeal erythema.   Eyes:      General: No scleral icterus.        Right eye: No discharge.         Left eye: No discharge.      Extraocular Movements: Extraocular movements intact.      Conjunctiva/sclera: Conjunctivae normal.      Pupils: Pupils are equal, round, and reactive to light.   Neck:      Thyroid: No thyroid mass or thyromegaly.   Cardiovascular:      Rate and Rhythm: Normal rate and regular rhythm.      Pulses: Normal pulses.      Heart sounds: Normal heart sounds.   Pulmonary:      Effort: Pulmonary effort is normal. No respiratory distress.      Breath sounds: Normal breath sounds. No stridor. No wheezing, rhonchi or rales.   Chest:      Chest wall: No tenderness.   Abdominal:      General: Abdomen is flat. Bowel sounds are normal. There is no distension.      Palpations: Abdomen is soft. There is no mass.      Tenderness: There is no abdominal tenderness.   Genitourinary:     General: Normal vulva.      Pubic Area: No rash.       Vagina: Vaginal discharge  present.      Cervix: Cervical bleeding present. No cervical motion tenderness, discharge, friability or erythema.   Musculoskeletal:         General: Normal range of motion.      Cervical back: Normal range of motion and neck supple.      Right lower leg: No edema.      Left lower leg: No edema.   Lymphadenopathy:      Cervical: No cervical adenopathy.   Skin:     General: Skin is warm and dry.      Coloration: Skin is not jaundiced.   Neurological:      General: No focal deficit present.      Mental Status: She is alert and oriented to person, place, and time.      Cranial Nerves: No cranial nerve deficit.      Motor: Motor function is intact.      Gait: Gait normal.   Psychiatric:         Mood and Affect: Mood normal.         Judgment: Judgment normal.            ASSESSMENT AND PLAN:   1. Wellness examination  Education provided on healthy lifestyle.  Diet: reduce saturated fats, simple carbs and excess sugars. Hydrate. Leafy greens, legumes, nuts/seeds, healthy sources of Omega 3, lean proteins, complex carbs, berries.   Exercise 30 min daily cardio as tolerated.   Immunizations reviewed and recommendations provided  Preventative health screening recommendations reviewed.   Previous lab and imaging results reviewed.    - Lipid Panel; Future  - TSH W Reflex To Free T4; Future  - Vitamin D, 25-Hydroxy; Future    2. Acute vaginitis  -Vaginal culture obtained, STI screening ordered.  -Discussed tx options with patient, will send for clotrimazole and await results.   - Vaginitis Vaginosis PCR Panel; Future  - Vaginitis Vaginosis PCR Panel    3. Dyspareunia, female  -Referral to Gynecology  - OBG Referral - In Network    4. Venereal disease screening  - HIV AG AB Combo [E]; Future  - T Pallidum Screening [place] TREP [E]; Future  - Chlamydia/Gc Amplification; Future  - Hepatitis Panel, Acute (4) [E]; Future    5. Encounter for vitamin deficiency screening  - Vitamin D, 25-Hydroxy; Future    6. Lipid screening  - Lipid  Panel; Future    7. Thyroid disorder screen  - TSH W Reflex To Free T4; Future    8. Encounter for screening for cervical cancer  - ThinPrep PAP Smear; Future  - Hpv Dna  High Risk , Thin Prep Collect; Future  - ThinPrep PAP Smear  - Hpv Dna  High Risk , Thin Prep Collect       The patient indicates understanding of these issues and agrees to the plan.  The patient is asked to return in PRN.     The above note was creating using Dragon speech recognition technology. Please excuse any typos.

## 2024-06-07 ENCOUNTER — TELEPHONE (OUTPATIENT)
Dept: INTERNAL MEDICINE CLINIC | Facility: CLINIC | Age: 34
End: 2024-06-07

## 2024-06-07 LAB
BV BACTERIA DNA VAG QL NAA+PROBE: POSITIVE
C GLABRATA DNA VAG QL NAA+PROBE: POSITIVE
C KRUSEI DNA VAG QL NAA+PROBE: NEGATIVE
CANDIDA DNA VAG QL NAA+PROBE: POSITIVE
HPV E6+E7 MRNA CVX QL NAA+PROBE: POSITIVE
T VAGINALIS DNA VAG QL NAA+PROBE: NEGATIVE

## 2024-06-07 NOTE — TELEPHONE ENCOUNTER
Patient called requesting to discuss her recent test results. I informed patient not all of the results are in yet and they have not been reviewed by Aditi Robles and the nurses cannot give her much information without the providers reviewing results first. I informed her she will receive a callback when they are reviewed. She requested to speak with a nurse still, call transferred to RN triage per patient request.

## 2024-06-07 NOTE — TELEPHONE ENCOUNTER
IRIS Alonso- patient has multiple in-depth questions and concerns regarding her HPV positive tests. Patient would like to speak with you. Is aware that you are out of the office until Monday. Please advise. Thank you

## 2024-06-08 RX ORDER — FLUCONAZOLE 150 MG/1
TABLET ORAL
Qty: 1 TABLET | Refills: 0 | Status: SHIPPED | OUTPATIENT
Start: 2024-06-08

## 2024-06-08 RX ORDER — METRONIDAZOLE 500 MG/1
500 TABLET ORAL 2 TIMES DAILY
Qty: 14 TABLET | Refills: 0 | Status: SHIPPED | OUTPATIENT
Start: 2024-06-08 | End: 2024-06-15

## 2024-06-08 NOTE — TELEPHONE ENCOUNTER
DR Martinez (on -call ==on behalf of MARANDA Alonso )=patient is positive for BV, she is on clotrimazole vaginal cream, any additional oral  antibiotic ?   See also concerns below.         COPIED and PASTE [employer] message 6/7/24;  From: Juan Gutierrez  To: Aditi Robles  Sent: 6/7/2024  3:49 PM CDT  Subject: Results       Hello Dr. Robles,      I received the Results and I am very concerned. I have so many questions and don’t understand how I ended up with HPV high risk. Is it from my current partner or previous partners?         OFFICE visit note 6/6/24;  2. Acute vaginitis  -Vaginal culture obtained, STI screening ordered.  -Discussed tx options with patient, will send for clotrimazole and await results.   - Vaginitis Vaginosis PCR Panel; Future  - Vaginitis Vaginosis PCR Panel      LABS 6/6/24;    Ref Range & Units 6/6/24  9:57 AM   HPV High Risk  Negative Positive Abnormal          pecimen Information: Vaginal; Other   0 Result Notes      Component  Ref Range & Units    Bacterial Vaginosis  Negative Positive Abnormal    Candida group  Negative Positive Abnormal    Comment: Candida group includes C. ablicans, C. tropicalis, C. parapsilosis, and  C. dubliniensis.    Because PCR is highly sensitive, the detection of Candida DNA can represent colonization or infection. Results should be interpreted in the context of patient symptoms.  Antimicrobial susceptibility testing will not be performed.   Nakaseomyces glabrata (Candida glabrata)  Negative Positive Abnormal    Comment: This organism has variable resistance to fluconazole.  Antimicrobial susceptibility testing will not be performed. Topical antifungal therapy is usually preferred.   Pichia kudriavzevii (Candida krusei)  Negative Negative   Trichomonas vaginalis  Negative Negative

## 2024-06-08 NOTE — TELEPHONE ENCOUNTER
Called re results    HPV positive  16 18 pending  Pap pending  Wait for result    Yeast and bacterial vaginosis  Metorgel vaginal and divlucan given    Follow up with LEXUS ANDERSEN    Patient voiced understanding  and agrees with plan

## 2024-06-10 ENCOUNTER — TELEMEDICINE (OUTPATIENT)
Dept: INTERNAL MEDICINE CLINIC | Facility: CLINIC | Age: 34
End: 2024-06-10
Payer: COMMERCIAL

## 2024-06-10 DIAGNOSIS — N76.0 BACTERIAL VAGINITIS: ICD-10-CM

## 2024-06-10 DIAGNOSIS — B96.89 BACTERIAL VAGINITIS: ICD-10-CM

## 2024-06-10 DIAGNOSIS — B37.31 CANDIDAL VAGINITIS: ICD-10-CM

## 2024-06-10 DIAGNOSIS — B37.9 CANDIDA GLABRATA INFECTION: Primary | ICD-10-CM

## 2024-06-10 NOTE — TELEPHONE ENCOUNTER
Patient has read [employer] message from INDIRA Shearer , BUT not from fátima Barajas .       Message from [facility]  Message [phone]  From  Kathi Beltran RN To  Juan Gutierrez Sent and Delivered  6/9/2024  7:38 AM   Last Read in [employer]  6/9/2024  4:47 PM by Juan Gutierrez       Future Appointments   Date Time Provider Department Center   7/25/2024 10:40 AM Adriana Lew MD [id number]

## 2024-06-10 NOTE — TELEPHONE ENCOUNTER
Patient viewed Diffbot message per date and time stamp below:  From  Jenn Gamboa RN To  Juan Gutierrez Sent and Delivered  6/10/2024  7:36 AM   Last Read in Diffbot  6/10/2024  9:30 AM by Juan Gutierrez

## 2024-06-10 NOTE — PROGRESS NOTES
Juan Gutierrez is a 33 year old female.    This visit is conducted using Telemedicine with live, interactive video and audio.    Patient has been referred to the [facility] website at [email or web address] to review the yearly Consent to Treat document.    Patient understands and accepts financial responsibility for any deductible, co-insurance and/or co-pays associated with this service.    HPI:   Pt is following up on lab results. She was seen one week ago for vaginitis. She was ordered routine labs and STI testing which she plans to complete this week. She tested positive for BV, candidiasis and candida glabrata. She was advised to start metronidazole and fluconazole which she has. Denies any new complaints. HPV positive but reflex is still pending, pap smear pending.   Current Outpatient Medications   Medication Sig Dispense Refill    Boric Acid Vaginal 600 MG Vaginal Suppos Place 600 mg vaginally at bedtime for 21 days. 21 suppository 0    fluconazole (DIFLUCAN) 150 MG Oral Tab Take 1 tablet for one dose 1 tablet 0    metRONIDAZOLE 500 MG Oral Tab Take 1 tablet (500 mg total) by mouth 2 (two) times daily for 7 days. 14 tablet 0    metroNIDAZOLE 0.75 % Vaginal Gel Apply 1 applicatorful daily for 5 days 1 each 0    acetaminophen 500 MG Oral Tab Take 1 tablet (500 mg total) by mouth every 6 (six) hours as needed for Pain. (Patient not taking: Reported on 6/6/2024)        Past Medical History:    Migraine    Migraines      Social History:  Social History     Socioeconomic History    Marital status: Single   Tobacco Use    Smoking status: Never    Smokeless tobacco: Never    Tobacco comments:     hookah   Vaping Use    Vaping status: Never Used   Substance and Sexual Activity    Alcohol use: Never    Drug use: No        REVIEW OF SYSTEMS:   Review of Systems   All other systems reviewed and are negative.         EXAM:   LMP 05/19/2024 (Approximate)     Physical Exam  Constitutional:       General: She is not in acute  distress.  Pulmonary:      Effort: Pulmonary effort is normal. No respiratory distress.   Neurological:      Mental Status: She is alert and oriented to person, place, and time.   Psychiatric:         Mood and Affect: Mood normal.         Judgment: Judgment normal.            ASSESSMENT AND PLAN:   1. Candida glabrata infection  -Start boric acid suppositories for 2 weeks (nightly).   - Boric Acid Vaginal 600 MG Vaginal Suppos; Place 600 mg vaginally at bedtime for 21 days.  Dispense: 21 suppository; Refill: 0    2. Bacterial vaginitis  -complete course of metronidazole.     3. Candidal vaginitis  -Complete course of fluconazole.     HPV testing is pending, will f/u once available.        The patient indicates understanding of these issues and agrees to the plan.  The patient is asked to return in 4-6 weeks. .     The above note was creating using Dragon speech recognition technology. Please excuse any typos.

## 2024-06-11 LAB
HPV16 DNA CVX QL PROBE+SIG AMP: NEGATIVE
HPV18 DNA CVX QL PROBE+SIG AMP: NEGATIVE

## 2024-07-11 ENCOUNTER — OFFICE VISIT (OUTPATIENT)
Dept: OBGYN CLINIC | Facility: CLINIC | Age: 34
End: 2024-07-11

## 2024-07-11 VITALS
SYSTOLIC BLOOD PRESSURE: 128 MMHG | DIASTOLIC BLOOD PRESSURE: 84 MMHG | BODY MASS INDEX: 31 KG/M2 | HEART RATE: 81 BPM | WEIGHT: 167.19 LBS

## 2024-07-11 DIAGNOSIS — Z30.09 BIRTH CONTROL COUNSELING: ICD-10-CM

## 2024-07-11 DIAGNOSIS — N76.0 VAGINITIS AND VULVOVAGINITIS: ICD-10-CM

## 2024-07-11 DIAGNOSIS — N94.10 DYSPAREUNIA IN FEMALE: Primary | ICD-10-CM

## 2024-07-11 DIAGNOSIS — R87.810 CERVICAL HIGH RISK HUMAN PAPILLOMAVIRUS (HPV) DNA TEST POSITIVE: ICD-10-CM

## 2024-07-11 PROBLEM — G43.109 MIGRAINE HEADACHE WITH AURA: Status: ACTIVE | Noted: 2020-01-30

## 2024-07-11 PROCEDURE — 99204 OFFICE O/P NEW MOD 45 MIN: CPT | Performed by: OBSTETRICS & GYNECOLOGY

## 2024-07-11 PROCEDURE — 3074F SYST BP LT 130 MM HG: CPT | Performed by: OBSTETRICS & GYNECOLOGY

## 2024-07-11 PROCEDURE — 3079F DIAST BP 80-89 MM HG: CPT | Performed by: OBSTETRICS & GYNECOLOGY

## 2024-07-11 RX ORDER — MISOPROSTOL 200 UG/1
TABLET ORAL
Qty: 2 TABLET | Refills: 0 | Status: SHIPPED | OUTPATIENT
Start: 2024-07-11

## 2024-07-11 NOTE — PROGRESS NOTES
Juan Gutierrez is a 33 year old female  Patient's last menstrual period was 2024 (approximate).   Chief Complaint   Patient presents with    Gyn Problem     C/O VAGINAL AND CERVICAL PAIN -- new pt. Now more active again. Notices occas lower abd pain during coitus when deep penetration.  Recently treated for vaginal infection -- does not know name ?yeast. Wishes to know how to prevent    Contraception     Used ocps in past. Does not recall name. (+) hx migraines w/ aura   .     OBSTETRICS HISTORY:  OB History    Para Term  AB Living   0 0 0 0 0 0   SAB IAB Ectopic Multiple Live Births   0 0 0 0 0       GYNE HISTORY:  Periods regular monthly    History   Sexual Activity    Sexual activity: Not on file       MEDICAL HISTORY:  Past Medical History:    Migraine headache with aura     Past Surgical History:   Procedure Laterality Date    Other surgical history      angiolipoma multiple excised    Rhinophototherapy application light bilateral      rhinoplasty     OB History    Para Term  AB Living   0 0 0 0 0 0   SAB IAB Ectopic Multiple Live Births   0 0 0 0 0        SOCIAL HISTORY:  Social History     Socioeconomic History    Marital status: Single     Spouse name: Not on file    Number of children: Not on file    Years of education: Not on file    Highest education level: Not on file   Occupational History    Not on file   Tobacco Use    Smoking status: Never    Smokeless tobacco: Never    Tobacco comments:     hookah   Vaping Use    Vaping status: Never Used   Substance and Sexual Activity    Alcohol use: Never    Drug use: No    Sexual activity: Not on file   Other Topics Concern    Caffeine Concern Not Asked    Exercise Not Asked    Seat Belt Not Asked    Special Diet Not Asked    Stress Concern Not Asked    Weight Concern Not Asked   Social History Narrative    Not on file     Social Determinants of Health     Financial Resource Strain: Not on file   Food Insecurity: Not on  file   Transportation Needs: Not on file   Physical Activity: Not on file   Stress: Not on file   Social Connections: Not on file   Housing Stability: Not on file       MEDICATIONS:    Current Outpatient Medications:     miSOPROStol (CYTOTEC) 200 MCG Oral Tab, Take 2 tablets night prior to procedure as needed, Disp: 2 tablet, Rfl: 0    metroNIDAZOLE 0.75 % Vaginal Gel, Apply 1 applicatorful daily for 5 days, Disp: 1 each, Rfl: 0    fluconazole (DIFLUCAN) 150 MG Oral Tab, Take 1 tablet for one dose (Patient not taking: Reported on 7/11/2024), Disp: 1 tablet, Rfl: 0    acetaminophen 500 MG Oral Tab, Take 1 tablet (500 mg total) by mouth every 6 (six) hours as needed for Pain. (Patient not taking: Reported on 7/11/2024), Disp: , Rfl:     ALLERGIES:  No Known Allergies      Review of Systems:  Constitutional:    denies fatigue, night sweats, hot flashes  Cardiovascular:   denies chest pain or palpitations  Respiratory:    denies shortness of breath  Gastrointestinal:   denies heartburn, abdominal pain, diarrhea or constipation  Genitourinary:    denies dysuria, incontinence, abnormal vaginal discharge, vaginal itching  Musculoskeletal:   denies back pain.  Skin/Breast:    denies any breast pain, lumps, or discharge.   Neurological:    denies headaches, extremity weakness or numbness.  Psychiatric:   denies depression or anxiety.  Endocrine:     denies excessive thirst or urination.  Heme/Lymph:    denies history of anemia, easy bruising or bleeding.      PHYSICAL EXAM:   /84   Pulse 81   Wt 167 lb 3.2 oz (75.8 kg)   LMP 06/19/2024 (Approximate)   BMI 30.58 kg/m²   Constitutional:   well developed, well nourished  Head/Face:  Normocephalic  Breast:   Normal symmetric - no masses / nipple discharge  Abdomen:    soft, nontender, nondistended, no masses  Skin/Hair:   no unusual rashes or bruises  Extremities:   no edema, no cyanosis  Psychiatric:    oriented to time, place, person and situation. Appropriate mood  and affect    Pelvic Exam:  External Genitalia:  normal appearance, hair distribution, and no lesions  Urethral Meatus:   normal in size, location, without lesions and prolapse  Bladder:    no fullness, masses or tenderness  Vagina:    normal appearance without lesions, no abnormal discharge  Cervix:    normal without tenderness on motion  Uterus:   normal in size, contour, position, mobility, without tenderness  Adnexa:   normal without masses or tenderness  Perineum:   normal  Anus:    no hemorroids   Lymph node:   no inguinal lymph nodes    Assessment & Plan:    Juan was seen today for gyn problem and contraception.    Diagnoses and all orders for this visit:    Dyspareunia in female  -     US PELVIS W EV (CPT=76856/62903); Future    Birth control counseling    Vaginitis and vulvovaginitis    Cervical high risk human papillomavirus (HPV) DNA test positive    Other orders  -     miSOPROStol (CYTOTEC) 200 MCG Oral Tab; Take 2 tablets night prior to procedure as needed        Requested Prescriptions     Signed Prescriptions Disp Refills    miSOPROStol (CYTOTEC) 200 MCG Oral Tab 2 tablet 0     Sig: Take 2 tablets night prior to procedure as needed       Reviewed how dyspareunia can occur w/o any abnormalities. Check ultrasound. Use position changes.  Reviewed how yeast & bacteria can be normal findings. Need to check frequency of recurrence prior to further Rx. Use condoms. Avoid sweets. Review of results show candida glabrata that needs topical Rx -- pt states Sx better.   Reviewed options of BCM given migraines with aura -- cannot use estrogen based products.  No breast exam done by [facility] thus performed today incase wished for BCM.   Consider Slynd / nexplanon / depoprovera / Elizabeth or Kyleena IUDs. Wishes for Skkyla IUD. Plan for  insertion D#2-5 after cytotec prep.  Pap 6/24 neg but HPV (+) w/ neg genotyping -- repeat needed 6/25        Spent total time 45 minutes on obtaining history / chart review, evaluating  patient / performing medically appropriate exam, discussing treatment options, counseling / educating, and completing documentation, coordinating care.

## 2024-10-17 ENCOUNTER — TELEMEDICINE (OUTPATIENT)
Dept: INTERNAL MEDICINE CLINIC | Facility: CLINIC | Age: 34
End: 2024-10-17
Payer: COMMERCIAL

## 2024-10-17 DIAGNOSIS — N76.0 ACUTE VAGINITIS: Primary | ICD-10-CM

## 2024-10-17 RX ORDER — FLUCONAZOLE 150 MG/1
TABLET ORAL
Qty: 1 TABLET | Refills: 0 | Status: SHIPPED | OUTPATIENT
Start: 2024-10-17

## 2024-10-17 NOTE — PROGRESS NOTES
Juan Gutierrez is a 33 year old female.    This visit is conducted using Telemedicine with live, interactive video and audio.    Patient has been referred to the [facility] website at [email or web address] to review the yearly Consent to Treat document.    Patient understands and accepts financial responsibility for any deductible, co-insurance and/or co-pays associated with this service.    HPI:   Pt reports internal and external vaginal irritation.She reports clear vaginal discharge, itching.  Denies any odor. Denies any concern for STI. Denies pain with intercourse, vaginal bleeding, pelvic pain, chills, fever, urinary sx.   Current Outpatient Medications   Medication Sig Dispense Refill    fluconazole (DIFLUCAN) 150 MG Oral Tab Take 1 tablet for one dose 1 tablet 0    miSOPROStol (CYTOTEC) 200 MCG Oral Tab Take 2 tablets night prior to procedure as needed 2 tablet 0    metroNIDAZOLE 0.75 % Vaginal Gel Apply 1 applicatorful daily for 5 days 1 each 0    acetaminophen 500 MG Oral Tab Take 1 tablet (500 mg total) by mouth every 6 (six) hours as needed for Pain. (Patient not taking: Reported on 7/11/2024)        Past Medical History:    Migraine headache with aura      Social History:  Social History     Socioeconomic History    Marital status: Single   Tobacco Use    Smoking status: Never    Smokeless tobacco: Never    Tobacco comments:     hookah   Vaping Use    Vaping status: Never Used   Substance and Sexual Activity    Alcohol use: Never    Drug use: No        REVIEW OF SYSTEMS:   Review of Systems   All other systems reviewed and are negative.         EXAM:   [facility] 06/19/2024 (Approximate)     Physical Exam  Constitutional:       General: She is not in acute distress.  Pulmonary:      Effort: Pulmonary effort is normal. No respiratory distress.   Neurological:      Mental Status: She is alert and oriented to person, place, and time.   Psychiatric:         Mood and Affect: Mood normal.         Judgment: Judgment  normal.            ASSESSMENT AND PLAN:   1. Acute vaginitis  -Discussed limitations of virtual visit, advised appt shouldbe in person for appropriate evaluation and testing. Advised to schedule appt for vaginal exam and culture. Pt verbalized understanding.   -Will send Rx for fluconazole based on sx report, reviewed use.   - fluconazole (DIFLUCAN) 150 MG Oral Tab; Take 1 tablet for one dose  Dispense: 1 tablet; Refill: 0       The patient indicates understanding of these issues and agrees to the plan.  The patient is asked to return in PRN/in person. .     The above note was creating using Dragon speech recognition technology. Please excuse any typos.

## 2024-10-21 ENCOUNTER — PATIENT MESSAGE (OUTPATIENT)
Dept: OBGYN CLINIC | Facility: CLINIC | Age: 34
End: 2024-10-21

## 2024-10-21 ENCOUNTER — OFFICE VISIT (OUTPATIENT)
Dept: INTERNAL MEDICINE CLINIC | Facility: CLINIC | Age: 34
End: 2024-10-21

## 2024-10-21 VITALS
HEIGHT: 62 IN | WEIGHT: 168 LBS | DIASTOLIC BLOOD PRESSURE: 85 MMHG | BODY MASS INDEX: 30.91 KG/M2 | HEART RATE: 80 BPM | SYSTOLIC BLOOD PRESSURE: 128 MMHG

## 2024-10-21 DIAGNOSIS — N76.1 SUBACUTE VAGINITIS: Primary | ICD-10-CM

## 2024-10-21 DIAGNOSIS — Z76.0 MEDICATION REFILL: Primary | ICD-10-CM

## 2024-10-21 LAB
BILIRUB UR QL: NEGATIVE
CLARITY UR: CLEAR
COLOR UR: COLORLESS
GLUCOSE UR-MCNC: NORMAL MG/DL
HGB UR QL STRIP.AUTO: NEGATIVE
KETONES UR-MCNC: NEGATIVE MG/DL
LEUKOCYTE ESTERASE UR QL STRIP.AUTO: NEGATIVE
NITRITE UR QL STRIP.AUTO: NEGATIVE
PH UR: 5.5 [PH] (ref 5–8)
PROT UR-MCNC: NEGATIVE MG/DL
SP GR UR STRIP: 1.01 (ref 1–1.03)
UROBILINOGEN UR STRIP-ACNC: NORMAL

## 2024-10-21 PROCEDURE — 3074F SYST BP LT 130 MM HG: CPT | Performed by: NURSE PRACTITIONER

## 2024-10-21 PROCEDURE — 99214 OFFICE O/P EST MOD 30 MIN: CPT | Performed by: NURSE PRACTITIONER

## 2024-10-21 PROCEDURE — 3079F DIAST BP 80-89 MM HG: CPT | Performed by: NURSE PRACTITIONER

## 2024-10-21 PROCEDURE — 3008F BODY MASS INDEX DOCD: CPT | Performed by: NURSE PRACTITIONER

## 2024-10-21 RX ORDER — BROMPHENIRAMIN/PSEUDOEPHEDRINE 1-15MG/5ML
1 LIQUID (ML) ORAL NIGHTLY
Qty: 21 G | Refills: 0 | Status: SHIPPED | OUTPATIENT
Start: 2024-10-21

## 2024-10-21 RX ORDER — FLUCONAZOLE 150 MG/1
150 TABLET ORAL ONCE
Qty: 1 TABLET | Refills: 0 | Status: SHIPPED | OUTPATIENT
Start: 2024-10-21 | End: 2024-10-21

## 2024-10-21 NOTE — PROGRESS NOTES
Juan Gutierrez is a 34 year old female.  HPI:   Pt f/u after recent virtual visit for internal and external vaginal itching and irritation. Reports clear discharge, Denies any odor. Denies concern for STI. Denies any pain with intercourse, vaginal bleeding, fever, chills, urinary  burning/frequency/urgency.   Current Outpatient Medications   Medication Sig Dispense Refill    fluconazole 150 MG Oral Tab Take 1 tablet (150 mg total) by mouth once for 1 dose. 1 tablet 0    clotrimazole (CLOTRIMAZOLE 3) 2 % Vaginal Cream Place 1 Applicatorful vaginally nightly. 21 g 0    fluconazole (DIFLUCAN) 150 MG Oral Tab Take 1 tablet for one dose 1 tablet 0    miSOPROStol (CYTOTEC) 200 MCG Oral Tab Take 2 tablets night prior to procedure as needed 2 tablet 0    metroNIDAZOLE 0.75 % Vaginal Gel Apply 1 applicatorful daily for 5 days 1 each 0    acetaminophen 500 MG Oral Tab Take 1 tablet (500 mg total) by mouth every 6 (six) hours as needed for Pain. (Patient not taking: Reported on 10/21/2024)        Past Medical History:    Migraine headache with aura      Social History:  Social History     Socioeconomic History    Marital status: Single   Tobacco Use    Smoking status: Never    Smokeless tobacco: Never    Tobacco comments:     hookah   Vaping Use    Vaping status: Never Used   Substance and Sexual Activity    Alcohol use: Never    Drug use: No        REVIEW OF SYSTEMS:   Review of Systems   All other systems reviewed and are negative.         EXAM:   /85 (BP Location: Right arm, Patient Position: Sitting, Cuff Size: adult)   Pulse 80   Ht 5' 2\" (1.575 m)   Wt 168 lb (76.2 kg)   LMP 09/30/2024 (Approximate)   BMI 30.73 kg/m²     Physical Exam  Vitals reviewed. Chaperone present: declined.   Constitutional:       General: She is not in acute distress.     Appearance: She is obese.   Genitourinary:     General: Normal vulva.      Vagina: No signs of injury. Vaginal discharge present. No erythema, tenderness or bleeding.       Cervix: No cervical motion tenderness, discharge, friability, erythema or cervical bleeding.      Comments: Scant thin and clumpy  white discharge  Skin:     General: Skin is warm.      Coloration: Skin is not jaundiced.      Findings: No erythema or rash.   Neurological:      Mental Status: She is alert and oriented to person, place, and time.   Psychiatric:         Mood and Affect: Mood normal.         Judgment: Judgment normal.            ASSESSMENT AND PLAN:   1. Subacute vaginitis  - Vaginitis Vaginosis PCR Panel; Future  - Urinalysis, Routine [E]; Future  - Urine Culture, Routine [E]; Future  - Vaginitis Vaginosis PCR Panel  - Urinalysis, Routine [E]  - Urine Culture, Routine [E]  - fluconazole 150 MG Oral Tab; Take 1 tablet (150 mg total) by mouth once for 1 dose.  Dispense: 1 tablet; Refill: 0  -Vaginal culture obtained, will send for UA/UC as well.   -Start vaginal clotrimazole and PO fluconazole.   -Avoid scented products.   -Advised to schedule with gynecology.     The patient indicates understanding of these issues and agrees to the plan.  The patient is asked to return in PRN.     The above note was creating using Dragon speech recognition technology. Please excuse any typos.

## 2024-10-22 DIAGNOSIS — B37.9 CANDIDA GLABRATA INFECTION: Primary | ICD-10-CM

## 2024-10-22 LAB
BV BACTERIA DNA VAG QL NAA+PROBE: NEGATIVE
C GLABRATA DNA VAG QL NAA+PROBE: POSITIVE
C KRUSEI DNA VAG QL NAA+PROBE: NEGATIVE
CANDIDA DNA VAG QL NAA+PROBE: NEGATIVE
T VAGINALIS DNA VAG QL NAA+PROBE: NEGATIVE

## 2024-10-22 RX ORDER — DROSPIRENONE 4 MG/1
1 TABLET, FILM COATED ORAL DAILY
Qty: 84 TABLET | Refills: 1 | Status: SHIPPED | OUTPATIENT
Start: 2024-10-22 | End: 2025-10-22

## 2024-10-23 NOTE — TELEPHONE ENCOUNTER
Patient sent 3 packs with 1 refill. Instructed to schedule follow up with 4th pack during active pills. Prescription sent.

## 2024-10-24 ENCOUNTER — HOSPITAL ENCOUNTER (OUTPATIENT)
Age: 34
Discharge: HOME | End: 2024-10-24
Attending: EMERGENCY MEDICINE
Payer: COMMERCIAL

## 2024-10-24 ENCOUNTER — LAB ENCOUNTER (OUTPATIENT)
Dept: LAB | Age: 34
End: 2024-10-24
Attending: NURSE PRACTITIONER
Payer: COMMERCIAL

## 2024-10-24 VITALS
DIASTOLIC BLOOD PRESSURE: 90 MMHG | RESPIRATION RATE: 20 BRPM | HEART RATE: 85 BPM | SYSTOLIC BLOOD PRESSURE: 134 MMHG | TEMPERATURE: 98 F | OXYGEN SATURATION: 99 %

## 2024-10-24 DIAGNOSIS — Z13.29 THYROID DISORDER SCREEN: ICD-10-CM

## 2024-10-24 DIAGNOSIS — H00.021 HORDEOLUM INTERNUM OF RIGHT UPPER EYELID: Primary | ICD-10-CM

## 2024-10-24 DIAGNOSIS — Z13.220 LIPID SCREENING: ICD-10-CM

## 2024-10-24 DIAGNOSIS — Z13.21 ENCOUNTER FOR VITAMIN DEFICIENCY SCREENING: ICD-10-CM

## 2024-10-24 DIAGNOSIS — R59.0 REACTIVE CERVICAL LYMPHADENOPATHY: ICD-10-CM

## 2024-10-24 DIAGNOSIS — Z11.3 VENEREAL DISEASE SCREENING: ICD-10-CM

## 2024-10-24 DIAGNOSIS — Z00.00 WELLNESS EXAMINATION: ICD-10-CM

## 2024-10-24 LAB
CHOLEST SERPL-MCNC: 202 MG/DL (ref ?–200)
FASTING PATIENT LIPID ANSWER: NO
HAV IGM SER QL: NONREACTIVE
HBV CORE IGM SER QL: NONREACTIVE
HBV SURFACE AG SERPL QL IA: NONREACTIVE
HCV AB SERPL QL IA: NONREACTIVE
HDLC SERPL-MCNC: 56 MG/DL (ref 40–59)
LDLC SERPL CALC-MCNC: 125 MG/DL (ref ?–100)
NONHDLC SERPL-MCNC: 146 MG/DL (ref ?–130)
T PALLIDUM AB SER QL IA: NONREACTIVE
TRIGL SERPL-MCNC: 120 MG/DL (ref 30–149)
TSI SER-ACNC: 1.28 MIU/ML (ref 0.55–4.78)
VIT D+METAB SERPL-MCNC: 15.7 NG/ML (ref 30–100)
VLDLC SERPL CALC-MCNC: 21 MG/DL (ref 0–30)

## 2024-10-24 PROCEDURE — 99213 OFFICE O/P EST LOW 20 MIN: CPT

## 2024-10-24 PROCEDURE — 84443 ASSAY THYROID STIM HORMONE: CPT

## 2024-10-24 PROCEDURE — 87389 HIV-1 AG W/HIV-1&-2 AB AG IA: CPT

## 2024-10-24 PROCEDURE — 80074 ACUTE HEPATITIS PANEL: CPT

## 2024-10-24 PROCEDURE — 86780 TREPONEMA PALLIDUM: CPT

## 2024-10-24 PROCEDURE — 36415 COLL VENOUS BLD VENIPUNCTURE: CPT

## 2024-10-24 PROCEDURE — 82306 VITAMIN D 25 HYDROXY: CPT

## 2024-10-24 PROCEDURE — 80061 LIPID PANEL: CPT

## 2024-10-24 RX ORDER — OFLOXACIN 3 MG/ML
2 SOLUTION/ DROPS OPHTHALMIC 4 TIMES DAILY
Qty: 5 ML | Refills: 0 | Status: SHIPPED | OUTPATIENT
Start: 2024-10-24

## 2024-10-24 NOTE — ED PROVIDER NOTES
Patient Seen in: [facility]      History     Chief Complaint   Patient presents with    Pain     Stated Complaint: lymph node swelling    Subjective:   HPI      The patient is a 34-year-old female with no significant past medical history who presents now with multiple medical complaints.  Patient has had some swelling and tenderness to the right upper eyelid for the last 5 days.  Patient states she did wear some lashes prior to this but has never had this happen before.  Over the last few days, patient has noted some tenderness and swelling to the right anterior cervical and right periauricular area.  The patient denies any fever.  The patient denies any sore throat or ear pain.    Objective:     Past Medical History:    Migraine headache with aura              Past Surgical History:   Procedure Laterality Date    Other surgical history      angiolipoma multiple excised    Rhinophototherapy application light bilateral      rhinoplasty                Social History     Socioeconomic History    Marital status: Single   Tobacco Use    Smoking status: Never    Smokeless tobacco: Never    Tobacco comments:     hookah   Vaping Use    Vaping status: Never Used   Substance and Sexual Activity    Alcohol use: Never    Drug use: No              Review of Systems    Positive for stated complaint: lymph node swelling  Other systems are as noted in HPI.  Constitutional and vital signs reviewed.      All other systems reviewed and negative except as noted above.    Physical Exam     ED Triage Vitals [10/24/24 1443]   /90   Pulse 85   Resp 20   Temp 97.8 °F (36.6 °C)   Temp src Temporal   SpO2 99 %   O2 Device None (Room air)       Current Vitals:   Vital Signs  BP: 134/90  Pulse: 85  Resp: 20  Temp: 97.8 °F (36.6 °C)  Temp src: Temporal    Oxygen Therapy  SpO2: 99 %  O2 Device: None (Room air)        Physical Exam    Constitutional: Well-developed well-nourished in no acute distress  Head: Normocephalic, no  swelling or tenderness  Eyes: Nonicteric sclera, no conjunctival injection.  There is mild swelling and tenderness to the right upper eyelid, most pronounced overlying the lateral one third of the right upper eyelid.  ENT: TMs are clear and flat bilaterally.  There is no posterior pharyngeal erythema  Chest: Clear to auscultation, no tenderness  Cardiovascular: Regular rate and rhythm without murmur  Abdomen: Soft, nontender and nondistended  Neurologic: Patient is awake, alert and oriented ×3.  The patient's motor strength is 5 out of 5 and symmetric in the upper and lower extremities bilaterally  Lymph: There is mild swelling and tenderness along the right anterior cervical chain with 1 lymph node approximately 0.5 cm in diameter.  There is minimal tenderness anterior to the right ear.  There are no epitrochlear or femoral lymph nodes palpable  Extremities: No focal swelling or tenderness  Skin: No pallor, no redness or warmth to the touch      ED Course   Labs Reviewed - No data to display         Both reactive lymphadenopathy secondary to the eye process as throat and right ear are normal.  Will initiate ofloxacin eyedrops, compresses.  Recommend follow-up in 7 to 10 days if no resolution of lymphadenopathy.       MDM      Junk to Lance versus stye; reactive lymphadenopathy versus primary lymphadenopathy        Medical Decision Making      Disposition and Plan     Clinical Impression:  1. Hordeolum internum of right upper eyelid    2. Reactive cervical lymphadenopathy         Disposition:  Discharge  10/24/2024  2:49 pm    Follow-up:  Darrell Izaguirre MD  [address] [place] [id number] [phone]      In 10 to 14 days for any persistent swollen lymph nodes          Medications Prescribed:  Current Discharge Medication List        START taking these medications    Details   ofloxacin 0.3 % Ophthalmic Solution Place 2 drops into the right eye 4 (four) times daily.  Qty: 5 mL, Refills: 0                  Supplementary Documentation:

## 2024-10-24 NOTE — ED INITIAL ASSESSMENT (HPI)
Patient arrives ambulatory with c/o right sided head and outer ear pain along with swollen lymph nodes in neck. Reports wearing lashes on 10/18 and her right eye being red and puffy.

## 2024-11-01 ENCOUNTER — HOSPITAL ENCOUNTER (OUTPATIENT)
Age: 34
Discharge: HOME | End: 2024-11-01
Attending: STUDENT IN AN ORGANIZED HEALTH CARE EDUCATION/TRAINING PROGRAM
Payer: COMMERCIAL

## 2024-11-01 VITALS
DIASTOLIC BLOOD PRESSURE: 86 MMHG | SYSTOLIC BLOOD PRESSURE: 123 MMHG | RESPIRATION RATE: 16 BRPM | HEART RATE: 67 BPM | OXYGEN SATURATION: 99 % | TEMPERATURE: 98 F

## 2024-11-01 DIAGNOSIS — J00 ACUTE RHINITIS: ICD-10-CM

## 2024-11-01 DIAGNOSIS — H10.9 CONJUNCTIVITIS OF BOTH EYES, UNSPECIFIED CONJUNCTIVITIS TYPE: Primary | ICD-10-CM

## 2024-11-01 PROCEDURE — 99214 OFFICE O/P EST MOD 30 MIN: CPT

## 2024-11-01 PROCEDURE — 99213 OFFICE O/P EST LOW 20 MIN: CPT

## 2024-11-01 RX ORDER — POLYMYXIN B SULFATE AND TRIMETHOPRIM 1; 10000 MG/ML; [USP'U]/ML
SOLUTION OPHTHALMIC
Qty: 10 ML | Refills: 0 | Status: SHIPPED | OUTPATIENT
Start: 2024-11-01

## 2024-11-01 NOTE — ED INITIAL ASSESSMENT (HPI)
Patient reports right eye redness and irritation.  States 1 week ago she was prescribed ofloxacin for right upper lid puffiness.  Noted itchiness to right eye last night as well as frequent sneezing.  Denies contact lens use.

## 2024-11-01 NOTE — DISCHARGE INSTRUCTIONS
Your exam is consistent with conjunctivitis/pinkeye which is an infection of the conjunctival of the eye.  I prescribed topical antibiotics, please apply as prescribed.  Symptoms should begin to improve within 72 hours on antibiotics, if there is no improvement or if you develop any new, changing, or progressing signs or symptoms, present immediately to your primary care physician for reassessment.  Infections can progress despite antibiotic treatment, if you develop any redness or swelling around the eye, pain with moving the eye, fevers, or vision changes, present immediately to the emergency department for further assessment.     You should throw away any make-up that may have come in contact with the eyes as this could be contaminated.  You should avoid wearing any eye make-up until completion of treatment and symptoms have resolved.    You can return to work if fever free for 24 hours without the use of antifever medication and on antibiotics for 24 hours.

## 2024-11-01 NOTE — ED PROVIDER NOTES
Patient Seen in: [facility]      History     Chief Complaint   Patient presents with    Eye Visual Problem     Stated Complaint: URI, Pink and itchy eye    Subjective:   HPI    34-year-old female with no significant past medical history, who presents with concern for pruritus of the B/L eyes and some pinkish discoloration of the B/L eyes mostly noted of the medial aspect of both eyes.  She denies any drainage.  She did note mild swelling earlier this morning but this is since resolved.  She has had a For over a year but no reactions to the.  She also notes nasal discharge and sneezing and pruritus.  She states that recently she was treated with ofloxacin eyedrops for an eye infection but she was not sure what the infection was.  Per chart review on 10/24/2024 patient was treated with ofloxacin eyedrops for a hordeolum/stye.  She states the symptoms have completely resolved.  Her symptoms were right eyelid redness and swelling and tenderness and associated cervical lymphadenopathy which is all resolved.  She states that she does not wear contact lenses but does wear make-up.  She had LASEK eye surgery a couple of years ago.  No recent mentation of the eyes.  She denies any fevers, vision changes, cough, or shortness of breath.    Objective:     Past Medical History:    Migraine headache with aura              Past Surgical History:   Procedure Laterality Date    Other surgical history      angiolipoma multiple excised    Rhinophototherapy application light bilateral      rhinoplasty                No pertinent social history.            Review of Systems    Positive for stated complaint: URI, Pink and itchy eye  Other systems are as noted in HPI.  Constitutional and vital signs reviewed.      All other systems reviewed and negative except as noted above.    Physical Exam     ED Triage Vitals [11/01/24 0808]   /86   Pulse 67   Resp 16   Temp 97.9 °F (36.6 °C)   Temp src Temporal   SpO2 99 %   O2  Device None (Room air)       Current Vitals:   Vital Signs  BP: 123/86  Pulse: 67  Resp: 16  Temp: 97.9 °F (36.6 °C)  Temp src: Temporal    Oxygen Therapy  SpO2: 99 %  O2 Device: None (Room air)        Physical Exam    General: Awake, alert, comfortable on room air, in no distress, tolerating oral secretions, interactive  Pulmonary: Lungs CTA B, no wheezing, no conversational dyspnea  Neuro: Symmetrical facial expressions on gross observation  HEENT: PERRL, no direct or consensual photophobia, no hyphema, no hypopyon, EOMI, no pain with extraocular muscle movement, no periorbital edema or erythema, on assessment of and under the eyelids no appreciated foreign bodies, no hordeolum, no chalazion, there is mild RT greater than left B/L scleral injection, visual acuity as per the medical assistant's assessment was 20/20 and 20/15, nonerythematous and nonedematous intact B/L TMs, no erythema or edema of the B/L mastoid regions, mildly erythematous and edematous nonobstructive B/L nasal turbinates, nonerythematous and nonedematous B/L tonsils, no tonsillar exudates, no peritonsillar edema, mild posterior pharyngeal cobblestoning, uvula midline, tolerating oral secretions, normal speech, no submandibular edema  Neck: No anterior or posterior cervical lymphadenopathy  Psych: Normal mood, normal affect    ED Course   No labs or imaging performed  MDM   Patient is awake, alert, comfortable on room air, in no distress, afebrile, lungs CTAB with no wheezing with no sign of acute bronchospasm, no sign of otitis media or otitis externa, no sign of tonsillitis or PTA or deep space infection at this time, no sign of hordeolum or chalazion, no sign of preseptal or septal cellulitis, no sign of hyphema or hypopyon, exam is consistent with likely early conjunctivitis with associated rhinitis and mild postnasal drainage  -We discussed possible early viral infection versus allergic conjunctivitis/rhinitis versus bacterial  conjunctivitis  -Would treat with antibiotics at this time for potential bacterial etiology.  Patient denies antibiotic allergies.  Patient is not a contact lens wearer and therefore no indication to cover for Pseudomonas.  Patient was recently treated with ofloxacin ophthalmic solution for a hordeolum per chart review of the note from 10/24/2024.  The symptoms have since resolved.  -Given the patient does not wear contact lenses, previous symptoms resolved, and given she was recently exposed to ofloxacin, would avoid further fluoroquinolones at this time, and with no indication to cover for Pseudomonas.  Therefore, have initiated Polytrim ophthalmic eyedrops.  Patient instructed on application.  There was no timeframe  On the ofloxacin eyedrops prescription per my review, and patient was unsure how long she should be taking the prescription for.  At this time, given hordeolum has resolved, I instructed her to discontinue the ofloxacin antibiotic eyedrops and to initiate the Polytrim antibiotic eyedrops.  -Work note provided via [employer].  -We discussed good handwashing to help reduce risk of transmission as well as washing of pillowcases and other areas that may be exposed.  -We discussed over-the-counter intranasal Flonase for rhinitis as well as over-the-counter nondrowsy antihistamine for pruritus and sneezing.  -We discussed cool compresses for symptomatic relief.  -We discussed symptoms of conjunctivitis/bacterial infection should begin to improve within 72 hours of antibiotics, but with no improvement I recommended immediate reassessment by her primary care physician.  With any new, changing, or progressing signs or symptoms I recommended immediate reassessment by her primary care physician.  -Currently, no sign of preseptal or septal cellulitis, but ED precautions discussed.    Medical Decision Making  Amount and/or Complexity of Data Reviewed  External Data Reviewed: notes.     Details: Immediate care note  from 10/24/2024    Risk  OTC drugs.  Prescription drug management.        Disposition and Plan     Clinical Impression:  1. Conjunctivitis of both eyes, unspecified conjunctivitis type    2. Acute rhinitis         Disposition:  Discharge  11/1/2024  8:27 am    Follow-up:  Darrell Izaguirre MD  [address] [place] [id number]  [phone]    In 3 days  As needed, If symptoms worsen          Medications Prescribed:  Discharge Medication List as of 11/1/2024  8:27 AM          polymyxin B-trimethoprim 08570-3.1 UNIT/ML-% Ophthalmic Solution 10 mL 0 11/1/2024 --   Sig:   Instill one drop into each eye every three hours while awake for seven days.  Do NOT exceed six doses per day.     Route:   (none)

## 2025-01-05 ENCOUNTER — HOSPITAL ENCOUNTER (OUTPATIENT)
Age: 35
Discharge: HOME | End: 2025-01-05
Attending: EMERGENCY MEDICINE
Payer: COMMERCIAL

## 2025-01-05 VITALS
SYSTOLIC BLOOD PRESSURE: 124 MMHG | RESPIRATION RATE: 22 BRPM | OXYGEN SATURATION: 97 % | HEART RATE: 125 BPM | TEMPERATURE: 100 F | DIASTOLIC BLOOD PRESSURE: 82 MMHG

## 2025-01-05 DIAGNOSIS — J11.1 INFLUENZA: Primary | ICD-10-CM

## 2025-01-05 PROCEDURE — 99213 OFFICE O/P EST LOW 20 MIN: CPT

## 2025-01-05 RX ORDER — AMOXICILLIN 500 MG/1
1000 TABLET, FILM COATED ORAL 3 TIMES DAILY
Qty: 42 TABLET | Refills: 0 | Status: SHIPPED | OUTPATIENT
Start: 2025-01-05 | End: 2025-01-05

## 2025-01-05 RX ORDER — BENZONATATE 100 MG/1
100 CAPSULE ORAL 3 TIMES DAILY PRN
Qty: 30 CAPSULE | Refills: 0 | Status: SHIPPED | OUTPATIENT
Start: 2025-01-05 | End: 2025-02-04

## 2025-01-05 NOTE — ED INITIAL ASSESSMENT (HPI)
Cough, chest congestion, generalized weakness for 1 week, no sob, no sore throat, mild runny nose

## 2025-01-18 NOTE — ED PROVIDER NOTES
Patient Seen in: [facility]      History     Chief Complaint   Patient presents with    Cough/URI     Stated Complaint: chest pain,cough    Subjective:   HPI      35 yo female with one week of cough, congestion and generalized fatigue/weakness. No documented fever. Known influenza.     Objective:     Past Medical History:    Migraine headache with aura              Past Surgical History:   Procedure Laterality Date    Other surgical history      angiolipoma multiple excised    Rhinophototherapy application light bilateral      rhinoplasty                Social History     Socioeconomic History    Marital status: Single   Tobacco Use    Smoking status: Never    Smokeless tobacco: Never    Tobacco comments:     hookah   Vaping Use    Vaping status: Never Used   Substance and Sexual Activity    Alcohol use: Never    Drug use: No              Review of Systems    Positive for stated complaint: chest pain,cough  Other systems are as noted in HPI.  Constitutional and vital signs reviewed.      All other systems reviewed and negative except as noted above.    Physical Exam     ED Triage Vitals [01/05/25 1523]   /82   Pulse (!) 125   Resp 22   Temp 100.1 °F (37.8 °C)   Temp src Oral   SpO2 97 %   O2 Device None (Room air)       Current Vitals:   No data recorded    Physical Exam  Vitals and nursing note reviewed.   Constitutional:       Appearance: Normal appearance. She is well-developed.   HENT:      Head: Normocephalic and atraumatic.      Nose: Congestion present.      Mouth/Throat:      Mouth: Mucous membranes are moist.   Cardiovascular:      Rate and Rhythm: Regular rhythm. Tachycardia present.      Heart sounds: Normal heart sounds.   Pulmonary:      Effort: Pulmonary effort is normal. No respiratory distress.      Breath sounds: Normal breath sounds.   Skin:     General: Skin is warm and dry.      Capillary Refill: Capillary refill takes less than 2 seconds.   Neurological:      General: No  focal deficit present.      Mental Status: She is alert.   Psychiatric:         Mood and Affect: Mood normal.         Behavior: Behavior normal.            ED Course   Labs Reviewed - No data to display                MDM           Medical Decision Making  Influenza, pneumonia both in differential. No physical exam evidence of pneumonia. Discharge on benzonatate for cough and over the counter ibuprofen, flonase, nyquil/dayquil.     Disposition and Plan     Clinical Impression:  1. Influenza         Disposition:  Discharge  1/5/2025  3:34 pm    Follow-up:  Darrell Izaguirre MD  [address] [place] [id number]  [phone]      As needed          Medications Prescribed:  Discharge Medication List as of 1/5/2025  3:35 PM        START taking these medications    Details   benzonatate 100 MG Oral Cap Take 1 capsule (100 mg total) by mouth 3 (three) times daily as needed for cough., Normal, Disp-30 capsule, R-0                 Supplementary Documentation:

## 2025-02-06 ENCOUNTER — PATIENT MESSAGE (OUTPATIENT)
Dept: INTERNAL MEDICINE CLINIC | Facility: CLINIC | Age: 35
End: 2025-02-06

## 2025-02-06 ENCOUNTER — E-VISIT (OUTPATIENT)
Dept: TELEHEALTH | Age: 35
End: 2025-02-06
Payer: COMMERCIAL

## 2025-02-06 DIAGNOSIS — N89.8 WHITE VAGINAL DISCHARGE: ICD-10-CM

## 2025-02-06 DIAGNOSIS — N76.0 ACUTE VAGINITIS: Primary | ICD-10-CM

## 2025-02-06 PROCEDURE — 99422 OL DIG E/M SVC 11-20 MIN: CPT | Performed by: PHYSICIAN ASSISTANT

## 2025-02-06 RX ORDER — TERCONAZOLE 8 MG/G
1 CREAM VAGINAL NIGHTLY
Qty: 20 G | Refills: 0 | Status: SHIPPED | OUTPATIENT
Start: 2025-02-06 | End: 2025-02-09

## 2025-02-06 NOTE — PROGRESS NOTES
Juan Gutierrez is a 34 year old female who initiated e-visit care today.    HPI:   See answers to questionnaire submission     Current Outpatient Medications   Medication Sig Dispense Refill       0      Past Medical History:    Migraine headache with aura      Past Surgical History:   Procedure Laterality Date    Other surgical history      angiolipoma multiple excised    Rhinophototherapy application light bilateral      rhinoplasty      Family History   Problem Relation Age of Onset    Heart Attack Father     Hypertension Father     Lipids Other     Diabetes Other     Hypertension Other     Cancer Maternal Uncle         blood    Breast Cancer Maternal Aunt 45    Stroke Paternal Uncle       Social History:  Social History     Socioeconomic History    Marital status: Single   Tobacco Use    Smoking status: Never    Smokeless tobacco: Never    Tobacco comments:     hookah   Vaping Use    Vaping status: Never Used   Substance and Sexual Activity    Alcohol use: Never    Drug use: No         ASSESSMENT AND PLAN:       Diagnoses and all orders for this visit:    Acute vaginitis  -     Terconazole 0.8 % Vaginal Cream; Place 1 applicator vaginally nightly for 3 days.    White vaginal discharge  -     Terconazole 0.8 % Vaginal Cream; Place 1 applicator vaginally nightly for 3 days.    Suspect yeast due to last vaginal culture results and similar sx. Due to type of yeast on culture last time, will avoid Diflucan due to high rates of resistance.  Advised boric acid for 5-7 days after done with terconazole.  Discussed vaginitis prevention      Duration of  the service:  16 minutes      See [employer] message exchange and Patient Instructions for Comfort Care and patient education.

## (undated) DEVICE — SUT MCRYL 3-0 18IN PS-2 ABSRB UD 19MM 3/8 CIR

## (undated) DEVICE — ADHESIVE SKIN TOP FOR WND CLSR DERMBND ADV

## (undated) DEVICE — SOLUTION IRRIG 1000ML 0.9% NACL USP BTL

## (undated) DEVICE — MINOR GENERAL: Brand: MEDLINE INDUSTRIES, INC.

## (undated) NOTE — LETTER
To Whom It May Concern:    Juan Gutierrez has been under our care regarding ongoing medical issues. Because of this, she has been required to restrict her physical activities.    She may resume her usual activities, including work, on a May 7, 2024 with the following restrictions:    []  None     []    No heavy lifting (over 15 pounds) for       2        weeks   []    Part-time (no more than             hours per week) for               week   []  Other:        Please feel free to contact us if there are any questions.      Sincerely,        Nando Chavez MD  [facility] [address] [address]  [phone]        Document generated by:  Nando Chavez MD

## (undated) NOTE — LETTER
Date & Time: 11/1/2024, 8:27 AM  Patient: Juan Gutierrez  Encounter Provider(s):    Manisha Nunez DO       To Whom It May Concern:    Juan Gutierrez was seen and treated in our department on 11/1/2024. She can return to work once on antibiotics for 24 hours and fever free for 24 hours without the use of antifever medication.    If you have any questions or concerns, please do not hesitate to call.        __Manisha Nunez DO___________________________  Physician/APC Signature

## (undated) NOTE — ED AVS SNAPSHOT
David Mojica   MRN: [id number]    Department:  [facility] Department   Date of Visit:  5/15/2019           Disclosure     Insurance plans vary and the physician(s) referred by the ER may not be covered by your plan.  Please contact you CARE PHYSICIAN AT ONCE OR RETURN IMMEDIATELY TO THE EMERGENCY DEPARTMENT. If you have been prescribed any medication(s), please fill your prescription right away and begin taking the medication(s) as directed.   If you believe that any of the medications

## (undated) NOTE — LETTER
[facility] [place]  [address] [facility]  Dept: [phone]  Dept Fax: [phone]: [phone]      November 8, 2017    Patient: Landy Simpson   Date of Visit: 11/8/2017       To Whom It May Concern:    Cara Parnell

## (undated) NOTE — LETTER
Date & Time: 12/13/2023, 3:55 PM  Patient: Fam August  Encounter Provider(s):    IRIS Bundy       To Whom It May Concern:    Liana Whitfield was seen and treated in our department on 12/12/2023. She should not return to work until 12/18/23 . If you have any questions or concerns, please do not hesitate to call.       Mariam Cornelius MD  _____________________________  [id number]/KPI Signature

## (undated) NOTE — LETTER
4/19/2024          To Whom It May Concern:    Juan Gutierrez is currently under my medical care and may not return to    Work at this time.  She may return to work full duty effective May 2nd.     If you require additional information please contact our office.        Sincerely,    Nando Chavez MD          Document generated by:  NW

## (undated) NOTE — ED AVS SNAPSHOT
[facility] AND [facility] in [address] [address] Nitesh Parks    Phone:  [phone]    Fax:  [address]   MRN: [id number]    Department:  [facility] AND [facility] in [address] [place] [address]   Date of Visit:  4/4/20 benefit level being available to you or other limited reimbursement. The physician may seek payment directly from you for amounts other than your deductible, co-payment, or co-insurance and for other services not covered under your health insurance plan. If you believe that any of the medications or instructions on this list is different from what your Primary Care doctor has instructed you - please continue to take your medications as instructed by your Primary Care doctor until you can check with your do [employer] [address] to help you get signed up for insurance coverage. Patient [address] is a Federal Navigator program that can help with your Affordable Care Act coverage, as well as all types of Medicaid plans.   To get signed up and covere

## (undated) NOTE — LETTER
Date & Time: 3/26/2023, 1:28 PM  Patient: Michelle High  Encounter Provider(s):    IRIS Concepcion       To Whom It May Concern:    Mic Henao was seen and treated in our department on 3/26/2023. She should not return to work until 03/30/2023. If you have any questions or concerns, please do not hesitate to call.     MARANDA Gifford    _____________________________  LQPPTOFAN/YASMINE Signature